# Patient Record
Sex: FEMALE | Race: BLACK OR AFRICAN AMERICAN
[De-identification: names, ages, dates, MRNs, and addresses within clinical notes are randomized per-mention and may not be internally consistent; named-entity substitution may affect disease eponyms.]

---

## 2017-05-04 ENCOUNTER — HOSPITAL ENCOUNTER (EMERGENCY)
Dept: HOSPITAL 17 - NEPK | Age: 52
Discharge: HOME | End: 2017-05-04
Payer: MEDICARE

## 2017-05-04 VITALS
HEART RATE: 84 BPM | TEMPERATURE: 97.8 F | SYSTOLIC BLOOD PRESSURE: 160 MMHG | DIASTOLIC BLOOD PRESSURE: 80 MMHG | OXYGEN SATURATION: 95 % | RESPIRATION RATE: 16 BRPM

## 2017-05-04 VITALS — HEIGHT: 65 IN | BODY MASS INDEX: 29.38 KG/M2 | WEIGHT: 176.37 LBS

## 2017-05-04 DIAGNOSIS — F17.210: ICD-10-CM

## 2017-05-04 DIAGNOSIS — I10: ICD-10-CM

## 2017-05-04 DIAGNOSIS — D64.9: ICD-10-CM

## 2017-05-04 DIAGNOSIS — Z79.01: ICD-10-CM

## 2017-05-04 DIAGNOSIS — E11.9: ICD-10-CM

## 2017-05-04 DIAGNOSIS — J01.90: Primary | ICD-10-CM

## 2017-05-04 PROCEDURE — 99283 EMERGENCY DEPT VISIT LOW MDM: CPT

## 2017-05-04 NOTE — PD
Physical Exam


Date Seen by Provider:  May 4, 2017


Time Seen by Provider:  15:35


Narrative


Pt is a 53 y/o female presenting to the ED with cc of left ear pain. This has 

been ongoing for a month. Pt c/o a frontal headache. She has not taken anything 

for the pain. Pt denies any nasal congestion, sore throat, cough. VSS. She has 

a primary she has not followed up with, she was prescribed abx recently.





Data


Data


Last Documented VS





Vital Signs








  Date Time  Temp Pulse Resp B/P Pulse Ox O2 Delivery O2 Flow Rate FiO2


 


5/4/17 15:30 97.8 84 16 160/80 95   











MDM


Supervised Visit with PANDA:  Erin Wyatt May 4, 2017 15:38

## 2017-05-04 NOTE — PD
HPI


.


left ear pain and frontal sinus pressure for 1 mt


Chief Complaint:  ENT Complaint


Time Seen by Provider:  15:40


Travel History


International Travel<30 days:  No


Contact w/Intl Traveler<30days:  No


Traveled to known affect area:  No





History of Present Illness


HPI


52-year-old female with hypertension, hyperlipidemia, diabetes and COPD here 

with complaints of left-sided ear pain and frontal sinus pressure for over one 

month.  Patient says she was seen by her primary care provider and completed 

antibiotics.  She initially got better and now her symptoms are back.  She 

decided to come to the emergency department for further evaluation.  Patient is 

complaining of a throbbing type of headache that is intermittent in her sinus 

area on the front.  She tells me that it's not really a headache but it's more 

like pressure.  She also tells me that she has some left ear pressure.  She 

denies any fever or chills.  She denies any recent illness.





PFSH


Past Medical History


Hx Anticoagulant Therapy:  Yes (ASA)


Anemia:  Yes


Arthritis:  Yes (LEFT KNEE)


Asthma:  No


Blood Disorders:  No


Anxiety:  No


Depression:  No


Heart Rhythm Problems:  No


Cancer:  No


Cardiac Catheterization:  Yes (DENIES BUT PREVIOUSLY LISTED AS YES)


Cardiovascular Problems:  Yes (HTN)


High Cholesterol:  Yes


Chemotherapy:  No


Chest Pain:  No


Congestive Heart Failure:  No


COPD:  Yes


Diabetes:  Yes


Diminished Hearing:  No


Endocrine:  Yes


GERD:  Yes


Glaucoma:  No


Gout:  Yes


Genitourinary:  No


Hepatitis:  No


Hiatal Hernia:  No


Hypertension:  Yes


Immune Disorder:  No


Implanted Vascular Access Dvce:  No


Kidney Stones:  No


Musculoskeletal:  Yes


Neurologic:  Yes (CVA)


Psychiatric:  No


Reproductive:  No


Respiratory:  Yes (COPD)


Immunizations Current:  Yes


Migraines:  No


Myocardial Infarction:  No


Pneumonia:  Yes


Radiation Therapy:  No


Renal Failure:  No


Seizures:  No


Sickle Cell Disease:  No


Sleep Apnea:  No


Thyroid Disease:  No


Ulcer:  No


Pregnant?:  Not Pregnant


Menopausal:  Yes


:  5


Para:  7


Miscarriage:  0


:  0


Tubal Ligation:  Yes





Past Surgical History


Abdominal Surgery:  No


Appendectomy:  No


Arteriovenous Shunt:  No


Cardiac Surgery:  No


 Section:  Yes (4)


Cholecystectomy:  No


Ear Surgery:  No


Endocrine Surgery:  No


Eye Surgery:  No


Genitourinary Surgery:  No


Gynecologic Surgery:  Yes (4 CSECTIONS)


Neurologic Surgery:  Yes (CVA)


Oral Surgery:  No


Pacemaker:  No


Thoracic Surgery:  No





Social History


Alcohol Use:  No


Tobacco Use:  Yes (1/2 PACK A DAY)


Substance Use:  No





Allergies-Medications


(Allergen,Severity, Reaction):  


Coded Allergies:  


     Levaquin (Verified  Allergy, Severe, ITCHING, 17)


 OCCURRED THIS FIRST DOSE; ONLY RECEIVED 1/2


Reported Meds & Prescriptions





Reported Meds & Active Scripts


Active


Flonase Nasal Spray (Fluticasone Nasal Spray) 50 Mcg/Act [place] 50 Mcg EACH NARE 

BID


Augmentin (Amoxicillin-Clavulanate) 875-125 mg Tab 875 Mg PO BID


     not for use in CrCl <30 ml/min.


Amoxicillin 500 Mg Cap 1 Tab PO TID


Deltasone (Prednisone) 20 Mg Tab 1 Tab PO DAILY 5 Days


Ventolin Hfa (Albuterol Sulfate) 18 Gm Aero 2 Puff INH Q4H PRN


     * SHAKE WELL BEFORE USE *


Klor-Con 10 (Potassium Chloride) 10 Meq Tabcr 20 Meq PO DAILY 30 Days


Nifedipine Er (Nifedipine) 60 Mg Tabcr 60 Mg PO DAILY 30 Days


Reported


Ambien 10 Mg Tab (Zolpidem Tartrate) 10 Mg Tab 10 Mg PO HS


Glipizide 5 Mg Tab 5 Mg PO BID


Famotidine 20 Mg Tab 20 Mg PO DAILY


Aspir-81 (Aspirin) 81 Mg Tab 81 Mg PO DAILY


Vitamin D (Cholecalciferol) 400 Unit Bashir 0 PO DAILY


     


     UNKNOWN DOSE


Proventil Ud 0.083% (2.5 Mg/3 Ml) (Albuterol Sulfate) 2.5 Mg/3 Ml Inha 2.5 Mg 

INH Q4 PRN


Atorvastatin 20 mg tab (Atorvastatin Calcium) 20 Mg Tab 20 Mg PO DAILY 30 Days


Metformin (Metformin HCl) 1,000 Mg Tab 1,000 Mg PO BID


Proair Hfa (Albuterol Sulfate) 8.5 Gm Aero 2 Puff INH Q4H PRN


     * SHAKE WELL BEFORE USE *


Hctz (Hydrochlorothiazide) 25 Mg Tab 25 Mg PO DAILY








Review of Systems


General / Constitutional:  No: Fever


Eyes:  No: Visual changes


HENT:  Positive: Headaches, Earache, Other (facial pressure)


Cardiovascular:  No: Chest Pain or Discomfort


Respiratory:  No: Shortness of Breath


Gastrointestinal:  No: Abdominal Pain


Genitourinary:  No: Dysuria


Musculoskeletal:  No: Pain


Skin:  No Rash


Neurologic:  No: Weakness


Psychiatric:  No: Depression


Endocrine:  No: Polydipsia


Hematologic/Lymphatic:  No: Easy Bruising





Physical Exam


Narrative


GENERAL: AAO x 3, no acute distress, Well-nourished, well-developed patient.


SKIN: Warm and dry. No visible rashes or bruising. 


HEAD: Normocephalic and atraumatic.


EYES: No scleral icterus. No injection or drainage. EOM intact, PERRLA, 


ENT: No nasal drainage noted. Mucous membranes pink. Airway patent. B/L ear 

canal with mild-moderate cerumen, TM otherwise normal, + post nasal drip, + 

frontal sinus tenderness 


NECK: Supple, trachea midline. No JVD.  No lymphadenopathy


CARDIOVASCULAR: Regular rate and rhythm without murmurs, gallops, or rubs. 


RESPIRATORY: Breath sounds equal bilaterally. No accessory muscle use. No 

rhonchi or rales. 


GASTROINTESTINAL: Visual inspection is normal


EXTREMITIES: No cyanosis or edema. 


BACK: Nontender without obvious deformity. No CVA tenderness.


PSYCH: AAO x 3, normal affect.





Data


Data


Last Documented VS





Vital Signs








  Date Time  Temp Pulse Resp B/P Pulse Ox O2 Delivery O2 Flow Rate FiO2


 


17 15:30 97.8 84 16 160/80 95   











MDM


Medical Decision Making


Medical Screen Exam Complete:  Yes


Emergency Medical Condition:  Yes


Medical Record Reviewed:  Yes


Differential Diagnosis


Sinusitis, otitis media, otitis externa cerumen impaction, less likely pneumonia


Narrative Course


52-year-old female with hypertension, hyperlipidemia, diabetes and COPD here 

with complaints of left-sided ear pain and frontal sinus pressure for over one 

month.  Patient says she was seen by her primary care provider and completed 

antibiotics.  She initially got better and now her symptoms are back.  She 

decided to come to the emergency department for further evaluation.  Patient is 

complaining of a throbbing type of headache that is intermittent in her sinus 

area on the front.  She tells me that it's not really a headache but it's more 

like pressure.  She also tells me that she has some left ear pressure.  She 

denies any fever or chills.  She denies any recent illness.





Patient seen and examined.  She appears to have an acute sinusitis.  She also 

has mild to moderate buildup of cerumen bilaterally in her ears.


I recommend that she try some antibiotics to help with her sinus infection.


I've also explained to her to try Debrox over-the-counter eardrops to help 

reduce her cerumen buildup.


She should try to follow-up with her primary care provider.





Patient verbalized understanding of instructions, questions were answered, and 

thanked me for their care. I advised them if their condition worsens, please 

return to the nearest emergency room for further care.





Diagnosis





 Primary Impression:  


 Acute sinusitis


 Qualified Code:  J01.10 - Acute non-recurrent frontal sinusitis


 Additional Impression:  


 Cerumen debris on tympanic membrane of both ears


Patient Instructions:  General Instructions





***Additional Instructions:


Please return to emergency department if your symptoms return or worsen. 


Follow up with your primary care provider. 


Take medications as prescribed.





Try over-the-counter Debrox to help reduce your ear wax buildup.


***Med/Other Pt SpecificInfo:  Prescription(s) given


Scripts


Fluticasone Nasal Spray (Flonase Nasal Spray)50 Mcg/Act Spray50 Mcg EACH NARE 

BID  #1 BOTTLE  Ref 0


   Prov:Tami Goyal MD         17 


Amoxicillin-Clavulanate (Augmentin)875-125 mg Brq093 Mg PO BID  #20 TAB


   not for use in CrCl <30 ml/min.


   Prov:Tami Goyal MD         17


Disposition:  01 DISCHARGE HOME


Condition:  Stable








Gifty Rivas May 4, 2017 15:40

## 2017-07-15 ENCOUNTER — HOSPITAL ENCOUNTER (EMERGENCY)
Dept: HOSPITAL 17 - NEPD | Age: 52
Discharge: HOME | End: 2017-07-15
Payer: MEDICARE

## 2017-07-15 VITALS
RESPIRATION RATE: 24 BRPM | SYSTOLIC BLOOD PRESSURE: 175 MMHG | DIASTOLIC BLOOD PRESSURE: 90 MMHG | TEMPERATURE: 98.7 F | HEART RATE: 98 BPM | OXYGEN SATURATION: 96 %

## 2017-07-15 VITALS — HEIGHT: 65 IN | WEIGHT: 174.17 LBS | BODY MASS INDEX: 29.02 KG/M2

## 2017-07-15 DIAGNOSIS — M54.89: Primary | ICD-10-CM

## 2017-07-15 DIAGNOSIS — E11.9: ICD-10-CM

## 2017-07-15 DIAGNOSIS — Z86.69: ICD-10-CM

## 2017-07-15 DIAGNOSIS — Z87.39: ICD-10-CM

## 2017-07-15 DIAGNOSIS — Z87.09: ICD-10-CM

## 2017-07-15 DIAGNOSIS — Z86.2: ICD-10-CM

## 2017-07-15 DIAGNOSIS — Z87.19: ICD-10-CM

## 2017-07-15 DIAGNOSIS — F17.200: ICD-10-CM

## 2017-07-15 DIAGNOSIS — Z79.84: ICD-10-CM

## 2017-07-15 DIAGNOSIS — I10: ICD-10-CM

## 2017-07-15 DIAGNOSIS — Z86.79: ICD-10-CM

## 2017-07-15 PROCEDURE — 99284 EMERGENCY DEPT VISIT MOD MDM: CPT

## 2017-07-15 PROCEDURE — 96372 THER/PROPH/DIAG INJ SC/IM: CPT

## 2017-07-15 NOTE — PD
HPI


Chief Complaint:  Back/ Neck Pain or Injury


Time Seen by Provider:  10:49


Travel History


International Travel<30 days:  No


Contact w/Intl Traveler<30days:  No


Traveled to known affect area:  No





History of Present Illness


HPI


Patient is a 52-year-old female comes in complaining of upper back pain.  She 

says about 2 days ago she was carrying a heavy box and a watermelon, the next 

morning she woke up with the severe pain.  She says pain is worse with 

movement.  She localizes the pain to Her left scapula.  She denies any direct 

injury.  She denies any falls.  She denies any numbness or tingling of her 

extremities.  She denies any incontinence.  She denies chest pain or shortness 

of breath.  She has not taken anything at home for pain.





PFSH


Past Medical History


Hx Anticoagulant Therapy:  No


Anemia:  Yes


Arthritis:  Yes (LEFT KNEE)


Asthma:  No


Blood Disorders:  No


Anxiety:  No


Depression:  No


Heart Rhythm Problems:  No


Cancer:  No


Cardiac Catheterization:  Yes (DENIES BUT PREVIOUSLY LISTED AS YES)


Cardiovascular Problems:  Yes (HTN)


High Cholesterol:  Yes


Chemotherapy:  No


Chest Pain:  No


Congestive Heart Failure:  No


COPD:  Yes


Cerebrovascular Accident:  Yes


Diabetes:  Yes


Patient Takes Glucophage:  Yes


Diminished Hearing:  No


Endocrine:  Yes


GERD:  Yes


Glaucoma:  No


Gout:  Yes


Genitourinary:  No


Hepatitis:  No


Hiatal Hernia:  No


Hypertension:  Yes


Immune Disorder:  No


Implanted Vascular Access Dvce:  No


Kidney Stones:  No


Musculoskeletal:  Yes


Neurologic:  Yes (CVA)


Psychiatric:  No


Reproductive:  No


Respiratory:  Yes (COPD)


Immunizations Current:  Yes


Migraines:  No


Myocardial Infarction:  No


Pneumonia:  Yes


Radiation Therapy:  No


Renal Failure:  No


Seizures:  No


Sickle Cell Disease:  No


Sleep Apnea:  No


Thyroid Disease:  No


Ulcer:  No


Tetanus Vaccination:  Unknown


Pregnant?:  Unknown


Menopausal:  Yes


:  5


Para:  7


Miscarriage:  0


:  0


Tubal Ligation:  Yes





Past Surgical History


Abdominal Surgery:  No


Appendectomy:  No


Arteriovenous Shunt:  No


 Section:  Yes (4)


Gynecologic Surgery:  Yes (4 CSECTIONS)


Hysterectomy:  No


Neurologic Surgery:  Yes (CVA)


Pacemaker:  No


Other Surgery:  Yes (BREAST SURGERY LEFT BREAST ABCESS 2X )





Social History


Alcohol Use:  No


Tobacco Use:  Yes (1/2 PACK A DAY)


Substance Use:  No





Allergies-Medications


(Allergen,Severity, Reaction):  


Coded Allergies:  


     Levaquin (Verified  Allergy, Severe, ITCHING, 7/15/17)


 OCCURRED THIS FIRST DOSE; ONLY RECEIVED 1/2


Reported Meds & Prescriptions





Reported Meds & Active Scripts


Active


Flonase Nasal Spray (Fluticasone Nasal Spray) 50 Mcg/Act [place] 50 Mcg EACH NARE 

BID


Augmentin (Amoxicillin-Clavulanate) 875-125 mg Tab 875 Mg PO BID


     not for use in CrCl <30 ml/min.


Amoxicillin 500 Mg Cap 1 Tab PO TID


Deltasone (Prednisone) 20 Mg Tab 1 Tab PO DAILY 5 Days


Ventolin Hfa (Albuterol Sulfate) 18 Gm Aero 2 Puff INH Q4H PRN


     * SHAKE WELL BEFORE USE *


Klor-Con 10 (Potassium Chloride) 10 Meq Tabcr 20 Meq PO DAILY 30 Days


Nifedipine Er (Nifedipine) 60 Mg Tabcr 60 Mg PO DAILY 30 Days


Reported


Ambien 10 Mg Tab (Zolpidem Tartrate) 10 Mg Tab 10 Mg PO HS


Glipizide 5 Mg Tab 5 Mg PO BID


Famotidine 20 Mg Tab 20 Mg PO DAILY


Aspir-81 (Aspirin) 81 Mg Tab 81 Mg PO DAILY


Vitamin D (Cholecalciferol) 400 Unit Bashir 0 PO DAILY


     


     UNKNOWN DOSE


Proventil Ud 0.083% (2.5 Mg/3 Ml) (Albuterol Sulfate) 2.5 Mg/3 Ml Inha 2.5 Mg 

INH Q4 PRN


Atorvastatin 20 mg tab (Atorvastatin Calcium) 20 Mg Tab 20 Mg PO DAILY 30 Days


Metformin (Metformin HCl) 1,000 Mg Tab 1,000 Mg PO BID


Proair Hfa (Albuterol Sulfate) 8.5 Gm Aero 2 Puff INH Q4H PRN


     * SHAKE WELL BEFORE USE *


Hctz (Hydrochlorothiazide) 25 Mg Tab 25 Mg PO DAILY








Review of Systems


Except as stated in HPI:  all other systems reviewed are Neg


General / Constitutional:  No: Fever, Chills


HENT:  No: Headaches, Lightheadedness


Cardiovascular:  No: Chest Pain or Discomfort


Respiratory:  No: Shortness of Breath


Gastrointestinal:  No: Nausea, Vomiting


Musculoskeletal:  Positive: Pain


Skin:  No Rash, No Change in Pigmentation


Neurologic:  No: Paresthesia, Sensory Disturbance





Physical Exam


Narrative


GENERAL: Awake and alert, in no acute distress.


SKIN: Focused skin assessment warm/dry.


HEAD: Atraumatic. Normocephalic. 


EYES: Pupils equal and round. No scleral icterus. No injection or drainage. 


ENT: No nasal bleeding or discharge.  Mucous membranes pink and moist.


NECK: Trachea midline. No JVD. 


CARDIOVASCULAR: Regular rate and rhythm.  No murmur appreciated.


RESPIRATORY: No accessory muscle use. Clear to auscultation. Breath sounds 

equal bilaterally. 


MUSCULOSKELETAL: No obvious deformities. No clubbing.  No cyanosis.  No edema. 

Tender to palpation adjacent to the left scapula.  No spinal tenderness.  


NEUROLOGICAL: Awake and alert. No obvious cranial nerve deficits.  Motor 

grossly within normal limits. Normal speech.


PSYCHIATRIC: Appropriate mood and affect; insight and judgment normal.





Data


Data


Last Documented VS





Vital Signs








  Date Time  Temp Pulse Resp B/P Pulse Ox O2 Delivery O2 Flow Rate FiO2


 


7/15/17 09:50 98.7 98 24 175/90 96 Room Air  








Orders





 Ketorolac Inj (Toradol Inj) (7/15/17 11:00)


Cyclobenzaprine (Flexeril) (7/15/17 11:00)








[facility]


Medical Decision Making


Medical Screen Exam Complete:  Yes


Emergency Medical Condition:  Yes


Medical Record Reviewed:  Yes


Differential Diagnosis


Musculoskeletal pain vs fracture vs spasm


Narrative Course


Patient is a 52-year-old female comes in complaining of back pain.  Exam shows 

tenderness to the paraspinal muscles.  Patient given Toradol and Flexeril.  She 

reports feeling much better.  Discharge with prescription for Flexeril.  She is 

advised to stretch and take Flexeril and Tylenol as needed.  Advised to follow-

up with her doctor.  Advised to return to the emergency department as needed 

for any worsening symptoms.





Diagnosis





 Primary Impression:  


 Musculoskeletal back pain


Patient Instructions:  General Instructions, Musculoskeletal Pain (ED)





***Additional Instructions:


Take Tylenol and Flexeril as needed for pain.  Follow up with your doctor.  

Return to the ED as needed for any worsening symptoms.


Scripts


Cyclobenzaprine (Flexeril)10 Mg Tab10 Mg PO TID  #15 TAB  Ref 0


   Prov:Tami Goyal MD         7/15/17


Disposition:  01 DISCHARGE HOME


Condition:  Stable








Tami Goyal MD Jul 15, 2017 11:43

## 2018-01-30 ENCOUNTER — HOSPITAL ENCOUNTER (INPATIENT)
Dept: HOSPITAL 17 - NEPE | Age: 53
LOS: 3 days | Discharge: HOME | DRG: 194 | End: 2018-02-02
Attending: HOSPITALIST | Admitting: HOSPITALIST
Payer: MEDICARE

## 2018-01-30 VITALS
DIASTOLIC BLOOD PRESSURE: 88 MMHG | SYSTOLIC BLOOD PRESSURE: 179 MMHG | RESPIRATION RATE: 18 BRPM | HEART RATE: 77 BPM | OXYGEN SATURATION: 97 %

## 2018-01-30 VITALS
SYSTOLIC BLOOD PRESSURE: 185 MMHG | OXYGEN SATURATION: 94 % | RESPIRATION RATE: 18 BRPM | DIASTOLIC BLOOD PRESSURE: 100 MMHG | TEMPERATURE: 97.5 F | HEART RATE: 88 BPM

## 2018-01-30 VITALS
SYSTOLIC BLOOD PRESSURE: 171 MMHG | TEMPERATURE: 98.5 F | DIASTOLIC BLOOD PRESSURE: 99 MMHG | OXYGEN SATURATION: 98 % | HEART RATE: 84 BPM | RESPIRATION RATE: 18 BRPM

## 2018-01-30 VITALS — OXYGEN SATURATION: 95 %

## 2018-01-30 VITALS
OXYGEN SATURATION: 92 % | RESPIRATION RATE: 18 BRPM | TEMPERATURE: 98.6 F | HEART RATE: 80 BPM | DIASTOLIC BLOOD PRESSURE: 94 MMHG | SYSTOLIC BLOOD PRESSURE: 169 MMHG

## 2018-01-30 VITALS — HEIGHT: 65 IN | BODY MASS INDEX: 32.03 KG/M2 | WEIGHT: 192.24 LBS

## 2018-01-30 VITALS — HEART RATE: 86 BPM

## 2018-01-30 DIAGNOSIS — N18.3: ICD-10-CM

## 2018-01-30 DIAGNOSIS — Z79.84: ICD-10-CM

## 2018-01-30 DIAGNOSIS — R74.8: ICD-10-CM

## 2018-01-30 DIAGNOSIS — N17.9: ICD-10-CM

## 2018-01-30 DIAGNOSIS — J44.0: ICD-10-CM

## 2018-01-30 DIAGNOSIS — J44.1: ICD-10-CM

## 2018-01-30 DIAGNOSIS — J10.00: Primary | ICD-10-CM

## 2018-01-30 DIAGNOSIS — E87.6: ICD-10-CM

## 2018-01-30 DIAGNOSIS — Z83.3: ICD-10-CM

## 2018-01-30 DIAGNOSIS — Z87.442: ICD-10-CM

## 2018-01-30 DIAGNOSIS — I12.9: ICD-10-CM

## 2018-01-30 DIAGNOSIS — D50.9: ICD-10-CM

## 2018-01-30 DIAGNOSIS — Z86.73: ICD-10-CM

## 2018-01-30 DIAGNOSIS — K21.9: ICD-10-CM

## 2018-01-30 DIAGNOSIS — E78.5: ICD-10-CM

## 2018-01-30 DIAGNOSIS — E11.21: ICD-10-CM

## 2018-01-30 DIAGNOSIS — Z87.891: ICD-10-CM

## 2018-01-30 DIAGNOSIS — Z82.49: ICD-10-CM

## 2018-01-30 DIAGNOSIS — E11.22: ICD-10-CM

## 2018-01-30 LAB
BASOPHILS # BLD AUTO: 0.1 TH/MM3 (ref 0–0.2)
BASOPHILS NFR BLD: 0.9 % (ref 0–2)
BUN SERPL-MCNC: 51 MG/DL (ref 7–18)
CALCIUM SERPL-MCNC: 8.3 MG/DL (ref 8.5–10.1)
CHLORIDE SERPL-SCNC: 112 MEQ/L (ref 98–107)
CREAT SERPL-MCNC: 2.25 MG/DL (ref 0.5–1)
EOSINOPHIL # BLD: 0.4 TH/MM3 (ref 0–0.4)
EOSINOPHIL NFR BLD: 6.3 % (ref 0–4)
ERYTHROCYTE [DISTWIDTH] IN BLOOD BY AUTOMATED COUNT: 15.9 % (ref 11.6–17.2)
GFR SERPLBLD BASED ON 1.73 SQ M-ARVRAT: 28 ML/MIN (ref 89–?)
GLUCOSE SERPL-MCNC: 105 MG/DL (ref 74–106)
HCO3 BLD-SCNC: 29.7 MEQ/L (ref 21–32)
HCT VFR BLD CALC: 32.2 % (ref 35–46)
HGB BLD-MCNC: 10.8 GM/DL (ref 11.6–15.3)
LYMPHOCYTES # BLD AUTO: 1 TH/MM3 (ref 1–4.8)
LYMPHOCYTES NFR BLD AUTO: 15.3 % (ref 9–44)
MAGNESIUM SERPL-MCNC: 2.5 MG/DL (ref 1.5–2.5)
MCH RBC QN AUTO: 27.9 PG (ref 27–34)
MCHC RBC AUTO-ENTMCNC: 33.5 % (ref 32–36)
MCV RBC AUTO: 83.2 FL (ref 80–100)
MONOCYTE #: 1.2 TH/MM3 (ref 0–0.9)
MONOCYTES NFR BLD: 18.1 % (ref 0–8)
NEUTROPHILS # BLD AUTO: 3.9 TH/MM3 (ref 1.8–7.7)
NEUTROPHILS NFR BLD AUTO: 59.4 % (ref 16–70)
PLATELET # BLD: 209 TH/MM3 (ref 150–450)
PMV BLD AUTO: 8.4 FL (ref 7–11)
RBC # BLD AUTO: 3.87 MIL/MM3 (ref 4–5.3)
SODIUM SERPL-SCNC: 148 MEQ/L (ref 136–145)
TROPONIN I SERPL-MCNC: 0.1 NG/ML (ref 0.02–0.05)
TROPONIN I SERPL-MCNC: 0.11 NG/ML (ref 0.02–0.05)
WBC # BLD AUTO: 6.6 TH/MM3 (ref 4–11)

## 2018-01-30 PROCEDURE — 83735 ASSAY OF MAGNESIUM: CPT

## 2018-01-30 PROCEDURE — 82948 REAGENT STRIP/BLOOD GLUCOSE: CPT

## 2018-01-30 PROCEDURE — 94640 AIRWAY INHALATION TREATMENT: CPT

## 2018-01-30 PROCEDURE — 82570 ASSAY OF URINE CREATININE: CPT

## 2018-01-30 PROCEDURE — 85025 COMPLETE CBC W/AUTO DIFF WBC: CPT

## 2018-01-30 PROCEDURE — 83550 IRON BINDING TEST: CPT

## 2018-01-30 PROCEDURE — 94664 DEMO&/EVAL PT USE INHALER: CPT

## 2018-01-30 PROCEDURE — 76775 US EXAM ABDO BACK WALL LIM: CPT

## 2018-01-30 PROCEDURE — 71046 X-RAY EXAM CHEST 2 VIEWS: CPT

## 2018-01-30 PROCEDURE — 84484 ASSAY OF TROPONIN QUANT: CPT

## 2018-01-30 PROCEDURE — 93005 ELECTROCARDIOGRAM TRACING: CPT

## 2018-01-30 PROCEDURE — 87804 INFLUENZA ASSAY W/OPTIC: CPT

## 2018-01-30 PROCEDURE — 80048 BASIC METABOLIC PNL TOTAL CA: CPT

## 2018-01-30 PROCEDURE — 85027 COMPLETE CBC AUTOMATED: CPT

## 2018-01-30 PROCEDURE — 84100 ASSAY OF PHOSPHORUS: CPT

## 2018-01-30 PROCEDURE — 82552 ASSAY OF CPK IN BLOOD: CPT

## 2018-01-30 PROCEDURE — 83540 ASSAY OF IRON: CPT

## 2018-01-30 PROCEDURE — 84300 ASSAY OF URINE SODIUM: CPT

## 2018-01-30 PROCEDURE — 82550 ASSAY OF CK (CPK): CPT

## 2018-01-30 PROCEDURE — 81001 URINALYSIS AUTO W/SCOPE: CPT

## 2018-01-30 PROCEDURE — 82043 UR ALBUMIN QUANTITATIVE: CPT

## 2018-01-30 RX ADMIN — INSULIN ASPART SCH: 100 INJECTION, SOLUTION INTRAVENOUS; SUBCUTANEOUS at 22:37

## 2018-01-30 RX ADMIN — OSELTAMIVIR PHOSPHATE SCH MG: 6 POWDER, FOR SUSPENSION ORAL at 23:05

## 2018-01-30 RX ADMIN — HEPARIN SODIUM SCH UNITS: 10000 INJECTION, SOLUTION INTRAVENOUS; SUBCUTANEOUS at 22:37

## 2018-01-30 RX ADMIN — Medication SCH ML: at 20:19

## 2018-01-30 RX ADMIN — ZOLPIDEM TARTRATE PRN MG: 10 TABLET, FILM COATED ORAL at 23:05

## 2018-01-30 RX ADMIN — PHENYTOIN SODIUM SCH MLS/HR: 50 INJECTION INTRAMUSCULAR; INTRAVENOUS at 22:39

## 2018-01-30 NOTE — PD
HPI


Chief Complaint:  Respiratory Symptoms


Time Seen by Provider:  17:49


Travel History


International Travel<30 days:  No


Contact w/Intl Traveler<30days:  No


Traveled to known affect area:  No





History of Present Illness


HPI


Patient is a 53-year-old female who comes in complaining of cough and shortness 

of breath.  She says this is been going on for 3 days.  She says she has pain 

to her upper abdomen/lower chest, but only when she coughs.  She says she is 

not sure if she has had fever or chills.  She says she just does not feel well.

  She has been using albuterol at home without much relief of her symptoms.  

She denies any sick contacts.  She denies nausea or vomiting.  She denies any 

leg swelling.  She says she did get a flu shot this year.





PFSH


Past Medical History


Hx Anticoagulant Therapy:  No


Anemia:  Yes


Arthritis:  Yes (LEFT KNEE)


Asthma:  No


Blood Disorders:  No


Anxiety:  No


Depression:  No


Heart Rhythm Problems:  No


Cancer:  No


Cardiac Catheterization:  Yes (DENIES BUT PREVIOUSLY LISTED AS YES)


Cardiovascular Problems:  Yes (HTN)


High Cholesterol:  Yes


Chemotherapy:  No


Chest Pain:  No


Congestive Heart Failure:  No


COPD:  Yes


Cerebrovascular Accident:  Yes


Diabetes:  Yes


Patient Takes Glucophage:  No


Diminished Hearing:  No


Endocrine:  Yes


GERD:  Yes


Glaucoma:  No


Gout:  Yes


Genitourinary:  No


Hepatitis:  No


Hiatal Hernia:  No


Hypertension:  Yes


Immune Disorder:  No


Implanted Vascular Access Dvce:  No


Kidney Stones:  No


Musculoskeletal:  Yes


Neurologic:  Yes (CVA)


Psychiatric:  No


Reproductive:  No


Respiratory:  Yes (COPD)


Immunizations Current:  Yes


Migraines:  No


Myocardial Infarction:  No


Pneumonia:  Yes


Radiation Therapy:  No


Renal Failure:  No


Seizures:  No


Sickle Cell Disease:  No


Sleep Apnea:  No


Thyroid Disease:  No


Ulcer:  No


Influenza Vaccination:  Yes


Pregnant?:  Not Pregnant


Menopausal:  Yes


:  5


Para:  7


Miscarriage:  0


:  0


Tubal Ligation:  Yes





Past Surgical History


Abdominal Surgery:  No


Appendectomy:  No


Arteriovenous Shunt:  No


 Section:  Yes (4)


Gynecologic Surgery:  Yes (4 CSECTIONS)


Hysterectomy:  No


Neurologic Surgery:  Yes (CVA)


Pacemaker:  No


Other Surgery:  Yes (BREAST SURGERY LEFT BREAST ABCESS 2X )





Social History


Alcohol Use:  No


Tobacco Use:  Yes


Substance Use:  No





Allergies-Medications


(Allergen,Severity, Reaction):  


Coded Allergies:  


     levofloxacin (Unverified  Allergy, Severe, ITCHING, 18)


 OCCURRED THIS FIRST DOSE; ONLY RECEIVED /2


Reported Meds & Prescriptions





Reported Meds & Active Scripts


Active


Reported


Glipizide 10 Mg Tab 10 Mg PO BIDAC


     Take 30 minutes before a meal


Zolpidem (Zolpidem Tartrate) 10 Mg Tab 10 Mg PO HS PRN


Vitamin C (Ascorbic Acid) 250 Mg Tab 500 Mg PO DAILY


Aspirin 81 Mg Chew 81 Mg CHEW DAILY


Nifedipine ER 24 HR (Nifedipine) 60 Mg Tab 60 Mg PO DAILY


Losartan (Losartan Potassium) 100 Mg Tab 100 Mg PO DAILY


Metformin (Metformin HCl) 1,000 Mg Tab 1,000 Mg PO DAILY


     With a meal


D3 Super Strength (Cholecalciferol) 2,000 Unit Cap 1,000 Units PO DAILY


Omega-3 Fish Oil/Vitamin (Fish Oil-Cholecalciferol) 1,000-1,000 Mg Cap 1 Cap PO 

DAILY








Review of Systems


Except as stated in HPI:  all other systems reviewed are Neg


General / Constitutional:  Positive: Fever, Chills


HENT:  No: Headaches, Lightheadedness


Respiratory:  Positive: Cough, Shortness of Breath


Gastrointestinal:  No: Nausea, Vomiting


Genitourinary:  No: Dysuria


Musculoskeletal:  Positive: Myalgias, No: Edema


Skin:  No Rash, No Change in Pigmentation


Neurologic:  No: Weakness, Dizziness





Physical Exam


Narrative


GENERAL: Awake and alert, in no acute distress.


SKIN: Focused skin assessment warm/dry.  No wounds or signs of infection.


HEAD: Atraumatic. Normocephalic. 


EYES: Pupils equal and round. No scleral icterus. 


ENT: Mucous membranes pink and moist.


NECK: Trachea midline. No JVD. 


CARDIOVASCULAR: Regular rate and rhythm.  No murmur appreciated.


RESPIRATORY: No accessory muscle use. Clear to auscultation. Breath sounds 

equal bilaterally. 


GASTROINTESTINAL: Abdomen soft, non-tender, nondistended. Hepatic and splenic 

margins not palpable. 


MUSCULOSKELETAL: No obvious deformities. No clubbing.  No cyanosis.  No edema. 


NEUROLOGICAL: Awake and alert. No obvious cranial nerve deficits.  Motor 

grossly within normal limits. Normal speech.


PSYCHIATRIC: Appropriate mood and affect; insight and judgment normal.





Data


Data


Last Documented VS





Vital Signs








  Date Time  Temp Pulse Resp B/P (MAP) Pulse Ox O2 Delivery O2 Flow Rate FiO2


 


18 15:14 98.5 84 18 171/99 (123) 98   








Orders





 Orders


Complete Blood Count With Diff (18 15:36)


Basic Metabolic Panel (Bmp) (18 15:36)


Magnesium (Mg) (18 15:36)


Ckmb (Isoenzyme) Profile (18 15:36)


Troponin I (18 15:36)


Influenzae A/B Antigen (18 15:36)


Electrocardiogram (18 15:36)


Chest, Pa & Lat (18 15:36)


CKMB (18 16:18)


CKMB% (18 16:18)


Ceftriaxone Inj (Rocephin Inj) (18 18:00)


Azithromycin Inj (Zithromax Inj) (18 18:00)


Aspirin Chew (Aspirin Chew) (18 18:00)


Sodium Chlor 0.9% 1000 Ml Inj (Ns 1000 M (18 18:00)


Acetaminophen (Tylenol) (18 18:00)


Methylprednisolone So Succ Inj (Solumedr (18 18:00)


Albuterol-Ipratropium Neb (Duoneb Neb) (18 18:00)


Admit Order (Ed Use Only) (18 )





Labs





Laboratory Tests








Test


  18


16:18


 


White Blood Count 6.6 TH/MM3 


 


Red Blood Count 3.87 MIL/MM3 


 


Hemoglobin 10.8 GM/DL 


 


Hematocrit 32.2 % 


 


Mean Corpuscular Volume 83.2 FL 


 


Mean Corpuscular Hemoglobin 27.9 PG 


 


Mean Corpuscular Hemoglobin


Concent 33.5 % 


 


 


Red Cell Distribution Width 15.9 % 


 


Platelet Count 209 TH/MM3 


 


Mean Platelet Volume 8.4 FL 


 


Neutrophils (%) (Auto) 59.4 % 


 


Lymphocytes (%) (Auto) 15.3 % 


 


Monocytes (%) (Auto) 18.1 % 


 


Eosinophils (%) (Auto) 6.3 % 


 


Basophils (%) (Auto) 0.9 % 


 


Neutrophils # (Auto) 3.9 TH/MM3 


 


Lymphocytes # (Auto) 1.0 TH/MM3 


 


Monocytes # (Auto) 1.2 TH/MM3 


 


Eosinophils # (Auto) 0.4 TH/MM3 


 


Basophils # (Auto) 0.1 TH/MM3 


 


CBC Comment DIFF FINAL 


 


Differential Comment  


 


Blood Urea Nitrogen 51 MG/DL 


 


Creatinine 2.25 MG/DL 


 


Random Glucose 105 MG/DL 


 


Calcium Level 8.3 MG/DL 


 


Magnesium Level 2.5 MG/DL 


 


Sodium Level 148 MEQ/L 


 


Potassium Level 4.0 MEQ/L 


 


Chloride Level 112 MEQ/L 


 


Carbon Dioxide Level 29.7 MEQ/L 


 


Anion Gap 6 MEQ/L 


 


Estimat Glomerular Filtration


Rate 28 ML/MIN 


 


 


Total Creatine Kinase 202 U/L 


 


Creatine Kinase MB 1.2 NG/ML 


 


Creatine Kinase MB % 0.6 % 


 


Troponin I 0.11 NG/ML 











MDM


Medical Decision Making


Medical Screen Exam Complete:  Yes


Emergency Medical Condition:  Yes


Medical Record Reviewed:  Yes


Interpretation(s)


ECG shows sinus rhythm, no ST elevation.


Differential Diagnosis


Influenza versus COPD exacerbation versus pneumonia


Narrative Course


Patient is a 53-year-old female comes in complaining of increasing shortness of 

breath.  IV established, labs sent.  Labs show a troponin of 0.11.  Flu swab is 

positive.  X-ray concerning for pneumonia.  Given antibiotics.  Given duo nebs.

  She will be admitted for further management.  Given aspirin.





Diagnosis





 Primary Impression:  


 COPD exacerbation


 Additional Impressions:  


 Elevated troponin


 Influenza


 Pneumonia


 Qualified Codes:  J18.9 - Pneumonia, unspecified organism





Admitting Information


Admitting Physician Requests:  Admit











Tami Goyal MD 2018 18:02

## 2018-01-30 NOTE — RADRPT
EXAM DATE/TIME:  01/30/2018 15:46 

 

[facility] COMPARISON:     

CHEST SINGLE AP, August 15, 2016, 7:33.

 

                     

INDICATIONS :     

Short of breath. Cough. 

                     

 

MEDICAL HISTORY :     

Chronic obstructive pulmonary disease.  Hypertension  Diabetes mellitus type II.   Stroke.    

 

SURGICAL HISTORY :     

None.   

 

ENCOUNTER:     

Initial                                        

 

ACUITY:     

3 days      

 

PAIN SCORE:     

0/10

 

LOCATION:     

Bilateral chest 

 

FINDINGS:     

PA and lateral views of the chest demonstrate minimal density right lower lobe. Left lung clear. Hear
t normal in size The cardiomediastinal contours are unremarkable.  Osseous structures are intact.

 

CONCLUSION:     

Minimal patchy densities right lower lobe could be infiltrate..

 

 

 

 Sarwat Villanueva MD on January 30, 2018 at 16:15           

Board Certified Radiologist.

 This report was verified electronically.

## 2018-01-30 NOTE — HHI.HP
__________________________________________________





HPI


Service


[facility]


Primary Care Physician


Barry James M.D.


Admission Diagnosis





Cough, Pneumonia, elevated troponin, influenza


Diagnoses:  


Travel History


International Travel<30 Days:  No


Contact w/Intl Traveler <30 Da:  No


Traveled to Known Affected Are:  No


History of Present Illness


53-year-old female with past medical history significant for COPD, diabetes 

mellitus, hypertension, hyperlipidemia and CK D presents to the emergency 

department with 3 days of progressively worsening shortness of breath.  Patient 

reports she had URI symptoms that began 3 days ago including congestion and a 

cough productive of yellow sputum.  She states she has been having a difficult 

time catching her breath, worse with activity.  She endorses fever/chills of 

one day duration.  Denies chest pain.  Patient is positive for flu and has a 

chest x-ray concerning for pneumonia.  Vital signs: Temperature 98.5, pulse 84, 

respirations 18, /99, pulse ox 98% on room air.





Review of Systems


Except as stated in HPI:  all other systems reviewed are Neg


Shortness of breath, productive cough, fever/chills





Past Family Social History


Past Medical History


COPD


Diabetes mellitus


Hypertension


Hyperlipidemia


CKD


Past Surgical History





Reported Medications





Reported Meds & Active Scripts


Active


Reported


Glipizide 10 Mg Tab 10 Mg PO BIDAC


     Take 30 minutes before a meal


Zolpidem (Zolpidem Tartrate) 10 Mg Tab 10 Mg PO HS PRN


Vitamin C (Ascorbic Acid) 250 Mg Tab 500 Mg PO DAILY


Aspirin 81 Mg Chew 81 Mg CHEW DAILY


Nifedipine ER 24 HR (Nifedipine) 60 Mg Tab 60 Mg PO DAILY


Losartan (Losartan Potassium) 100 Mg Tab 100 Mg PO DAILY


Metformin (Metformin HCl) 1,000 Mg Tab 1,000 Mg PO DAILY


     With a meal


D3 Super Strength (Cholecalciferol) 2,000 Unit Cap 1,000 Units PO DAILY


Omega-3 Fish Oil/Vitamin (Fish Oil-Cholecalciferol) 1,000-1,000 Mg Cap 1 Cap PO 

DAILY


Allergies:  


Coded Allergies:  


     levofloxacin (Unverified  Allergy, Severe, ITCHING, 18)


 OCCURRED THIS FIRST DOSE; ONLY RECEIVED 1/2


Family History


Mother with CAD/DM


Social History


Quit smoking on 17.  Denies alcohol, illicit drugs.





Physical Exam


Vital Signs





Vital Signs








  Date Time  Temp Pulse Resp B/P (MAP) Pulse Ox O2 Delivery O2 Flow Rate FiO2


 


18 19:55  77 18 179/88 (118) 97 Room Air  


 


18 15:14 98.5 84 18 171/99 (123) 98   








Physical Exam


GENERAL:  female sitting up in bed


SKIN: No rashes, ecchymoses or lesions. Cool and dry.


HEAD: Atraumatic. Normocephalic. No temporal or scalp tenderness.


EYES: Pupils equal round and reactive. Extraocular motions intact. No scleral 

icterus. No injection or drainage. 


ENT: Nose without bleeding, purulent drainage or septal hematoma. Throat 

without erythema, tonsillar hypertrophy or exudate. Uvula midline. Airway 

patent.


NECK: Trachea midline. No JVD or lymphadenopathy. Supple, nontender, no 

meningeal signs.


CARDIOVASCULAR: Regular rate and rhythm without murmurs, gallops, or rubs. 


RESPIRATORY: Clear to auscultation. Breath sounds equal bilaterally. No wheezes

, rales, or rhonchi.  Poor air movement.


GASTROINTESTINAL: Abdomen soft, non-tender, nondistended. No hepato-splenomegaly

, or palpable masses. No guarding.


MUSCULOSKELETAL: Extremities without clubbing, cyanosis, or edema. No joint 

tenderness, effusion, or edema noted. No calf tenderness. 


NEUROLOGICAL: Awake and alert. Cranial nerves II through XII intact.  Motor and 

sensory grossly within normal limits. Normal speech.


Laboratory





Laboratory Tests








Test


  18


16:18


 


White Blood Count 6.6 


 


Red Blood Count 3.87 


 


Hemoglobin 10.8 


 


Hematocrit 32.2 


 


Mean Corpuscular Volume 83.2 


 


Mean Corpuscular Hemoglobin 27.9 


 


Mean Corpuscular Hemoglobin


Concent 33.5 


 


 


Red Cell Distribution Width 15.9 


 


Platelet Count 209 


 


Mean Platelet Volume 8.4 


 


Neutrophils (%) (Auto) 59.4 


 


Lymphocytes (%) (Auto) 15.3 


 


Monocytes (%) (Auto) 18.1 


 


Eosinophils (%) (Auto) 6.3 


 


Basophils (%) (Auto) 0.9 


 


Neutrophils # (Auto) 3.9 


 


Lymphocytes # (Auto) 1.0 


 


Monocytes # (Auto) 1.2 


 


Eosinophils # (Auto) 0.4 


 


Basophils # (Auto) 0.1 


 


CBC Comment DIFF FINAL 


 


Differential Comment  


 


Blood Urea Nitrogen 51 


 


Creatinine 2.25 


 


Random Glucose 105 


 


Calcium Level 8.3 


 


Magnesium Level 2.5 


 


Sodium Level 148 


 


Potassium Level 4.0 


 


Chloride Level 112 


 


Carbon Dioxide Level 29.7 


 


Anion Gap 6 


 


Estimat Glomerular Filtration


Rate 28 


 


 


Total Creatine Kinase 202 


 


Creatine Kinase MB 1.2 


 


Creatine Kinase MB % 0.6 


 


Troponin I 0.11 














 Date/Time


Source Procedure


Growth Status


 


 


 18 16:18


Nasal Aspirate Influenza Types A,B Antigen (BIANCA) - Final


Positive For Flu A Antigen Complete








Result Diagram:  


18 1618                                                                   

             18 1618








Caprin VTE Risk Assessment


Caprin VTE Risk Assessment:  No/Low Risk (score <= 1)


Caprini Risk Assessment Model











 Point Value = 1          Point Value = 2  Point Value = 3  Point Value = 5


 


Age 41-60


Minor surgery


BMI > 25 kg/m2


Swollen legs


Varicose veins


Pregnancy or postpartum


History of unexplained or recurrent


   spontaneous 


Oral contraceptives or hormone


   replacement


Sepsis (< 1 month)


Serious lung disease, including


   pneumonia (< 1 month)


Abnormal pulmonary function


Acute myocardial infarction


Congestive heart failure (< 1 month)


History of inflammatory bowel disease


Medical patient at bed rest Age 61-74


Arthroscopic surgery


Major open surgery (> 45 min)


Laparoscopic surgery (> 45 min)


Malignancy


Confined to bed (> 72 hours)


Immobilizing plaster cast


Central venous access Age >= 75


History of VTE


Family history of VTE


Factor V Leiden


Prothrombin 98205R


Lupus anticoagulant


Anticardiolipin antibodies


Elevated serum homocysteine


Heparin-induced thrombocytopenia


Other congenital or acquired


   thrombophilia Stroke (< 1 month)


Elective arthroplasty


Hip, pelvis, or leg fracture


Acute spinal cord injury (< 1 month)








Prophylaxis Regimen











   Total Risk


Factor Score Risk Level Prophylaxis Regimen


 


0-1      Low Early ambulation


 


2 Moderate Order ONE of the following:


*Sequential Compression Device (SCD)


*Heparin 5000 units SQ BID


 


3-4 Higher Order ONE of the following medications:


*Heparin 5000 units SQ TID


*Enoxaparin/Lovenox 40 mg SQ daily (WT < 150 kg, CrCl > 30 mL/min)


*Enoxaparin/Lovenox 30 mg SQ daily (WT < 150 kg, CrCl > 10-29 mL/min)


*Enoxaparin/Lovenox 30 mg SQ BID (WT < 150 kg, CrCl > 30 mL/min)


AND/OR


*Sequential Compression Device (SCD)


 


5 or more Highest Order ONE of the following medications:


*Heparin 5000 units SQ TID (Preferred with Epidurals)


*Enoxaparin/Lovenox 40 mg SQ daily (WT < 150 kg, CrCl > 30 mL/min)


*Enoxaparin/Lovenox 30 mg SQ daily (WT < 150 kg, CrCl > 10-29 mL/min)


*Enoxaparin/Lovenox 30 mg SQ BID (WT < 150 kg, CrCl > 30 mL/min)


AND


*Sequential Compression Device (SCD)











Assessment and Plan


Assessment and Plan


Assessment/plan:





1.  Influenza


Positive for flu a antigen


Tamiflu





2.  Community acquired pneumonia/SOB


Chest x-ray showed minimal patchy densities in the right lower lobe, possible 

infiltrate, personally reviewed


Rocephin/azithromycin


Duo nebs


Supplemental oxygen when necessary





3.  Acute on chronic renal insufficiency


Creatinine 2.25, most recent value for comparison was 0.84 on 


Patient reports a history of chronic kidney disease


IV fluid hydration


Monitor renal function





4.  Elevated troponin


Troponin 0.11


Initial EKG showed NSR with T wave inversion in V5/V6, no ST segment elevations 

or depressions, personally reviewed


Likely secondary to renal insufficiency


ACS rule out pending, serial troponins/EKGs





5.  COPD


Treatment as above


IV steroids





6.  Diabetes mellitus


Holding home oral anti-hyperglycemics


SSI


Monitor blood glucose





7.  Hypertension/hyperlipidemia


Continue home medications





FEN


NS at 100 cc/hr


Heart healthy diet


Electrolytes: monitor and replete prn


SCDs





Physician Certification


2 Midnight Certification Type:  Admission for Inpatient Services


Order for Inpatient Services


The services are ordered in accordance with Medicare regulations or non-

Medicare payer requirements, as applicable.  In the case of services not 

specified as inpatient-only, they are appropriately provided as inpatient 

services in accordance with the 2-midnight benchmark.


Estimated LOS (days):  2


2 days is the estimated time the patient will need to remain in the hospital, 

assuming treatment plan goals are met and no additional complications.


Post-Hospital Plan:  Not yet determined











Dorie Lanier MD 2018 20:32

## 2018-01-31 VITALS
DIASTOLIC BLOOD PRESSURE: 87 MMHG | OXYGEN SATURATION: 95 % | RESPIRATION RATE: 20 BRPM | HEART RATE: 72 BPM | TEMPERATURE: 97.7 F | SYSTOLIC BLOOD PRESSURE: 166 MMHG

## 2018-01-31 VITALS
TEMPERATURE: 98.8 F | RESPIRATION RATE: 18 BRPM | SYSTOLIC BLOOD PRESSURE: 175 MMHG | HEART RATE: 73 BPM | DIASTOLIC BLOOD PRESSURE: 100 MMHG | OXYGEN SATURATION: 95 %

## 2018-01-31 VITALS — HEART RATE: 73 BPM

## 2018-01-31 VITALS
TEMPERATURE: 98.8 F | RESPIRATION RATE: 20 BRPM | SYSTOLIC BLOOD PRESSURE: 186 MMHG | DIASTOLIC BLOOD PRESSURE: 104 MMHG | OXYGEN SATURATION: 95 % | HEART RATE: 93 BPM

## 2018-01-31 VITALS
TEMPERATURE: 97.8 F | OXYGEN SATURATION: 94 % | DIASTOLIC BLOOD PRESSURE: 90 MMHG | HEART RATE: 67 BPM | SYSTOLIC BLOOD PRESSURE: 165 MMHG | RESPIRATION RATE: 20 BRPM

## 2018-01-31 VITALS — HEART RATE: 77 BPM

## 2018-01-31 VITALS — OXYGEN SATURATION: 96 %

## 2018-01-31 LAB
BASOPHILS # BLD AUTO: 0 TH/MM3 (ref 0–0.2)
BASOPHILS NFR BLD: 0.5 % (ref 0–2)
BUN SERPL-MCNC: 43 MG/DL (ref 7–18)
CALCIUM SERPL-MCNC: 8.3 MG/DL (ref 8.5–10.1)
CHLORIDE SERPL-SCNC: 112 MEQ/L (ref 98–107)
CREAT SERPL-MCNC: 1.93 MG/DL (ref 0.5–1)
EOSINOPHIL # BLD: 0 TH/MM3 (ref 0–0.4)
EOSINOPHIL NFR BLD: 0.1 % (ref 0–4)
ERYTHROCYTE [DISTWIDTH] IN BLOOD BY AUTOMATED COUNT: 15.9 % (ref 11.6–17.2)
GFR SERPLBLD BASED ON 1.73 SQ M-ARVRAT: 33 ML/MIN (ref 89–?)
GLUCOSE SERPL-MCNC: 183 MG/DL (ref 74–106)
HCO3 BLD-SCNC: 24.2 MEQ/L (ref 21–32)
HCT VFR BLD CALC: 30.5 % (ref 35–46)
HGB BLD-MCNC: 10.1 GM/DL (ref 11.6–15.3)
LYMPHOCYTES # BLD AUTO: 0.7 TH/MM3 (ref 1–4.8)
LYMPHOCYTES NFR BLD AUTO: 12.3 % (ref 9–44)
MCH RBC QN AUTO: 27.2 PG (ref 27–34)
MCHC RBC AUTO-ENTMCNC: 33.1 % (ref 32–36)
MCV RBC AUTO: 82.1 FL (ref 80–100)
MONOCYTE #: 0.3 TH/MM3 (ref 0–0.9)
MONOCYTES NFR BLD: 6.1 % (ref 0–8)
NEUTROPHILS # BLD AUTO: 4.5 TH/MM3 (ref 1.8–7.7)
NEUTROPHILS NFR BLD AUTO: 81 % (ref 16–70)
PLATELET # BLD: 185 TH/MM3 (ref 150–450)
PMV BLD AUTO: 8.3 FL (ref 7–11)
RBC # BLD AUTO: 3.71 MIL/MM3 (ref 4–5.3)
SODIUM SERPL-SCNC: 143 MEQ/L (ref 136–145)
TROPONIN I SERPL-MCNC: 0.11 NG/ML (ref 0.02–0.05)
WBC # BLD AUTO: 5.5 TH/MM3 (ref 4–11)

## 2018-01-31 RX ADMIN — ASPIRIN 81 MG SCH MG: 81 TABLET ORAL at 09:34

## 2018-01-31 RX ADMIN — NIFEDIPINE SCH MG: 60 TABLET, FILM COATED, EXTENDED RELEASE ORAL at 09:33

## 2018-01-31 RX ADMIN — ACYCLOVIR SCH UNITS: 800 TABLET ORAL at 17:58

## 2018-01-31 RX ADMIN — INSULIN ASPART SCH: 100 INJECTION, SOLUTION INTRAVENOUS; SUBCUTANEOUS at 17:00

## 2018-01-31 RX ADMIN — THIAMINE HYDROCHLORIDE SCH MLS/HR: 100 INJECTION, SOLUTION INTRAMUSCULAR; INTRAVENOUS at 16:30

## 2018-01-31 RX ADMIN — HEPARIN SODIUM SCH UNITS: 10000 INJECTION, SOLUTION INTRAVENOUS; SUBCUTANEOUS at 14:26

## 2018-01-31 RX ADMIN — INSULIN ASPART SCH: 100 INJECTION, SOLUTION INTRAVENOUS; SUBCUTANEOUS at 22:23

## 2018-01-31 RX ADMIN — PHENYTOIN SODIUM SCH MLS/HR: 50 INJECTION INTRAMUSCULAR; INTRAVENOUS at 06:30

## 2018-01-31 RX ADMIN — Medication SCH ML: at 21:00

## 2018-01-31 RX ADMIN — SODIUM CHLORIDE SCH MLS/HR: 900 INJECTION INTRAVENOUS at 17:52

## 2018-01-31 RX ADMIN — AZITHROMYCIN SCH MLS/HR: 500 INJECTION, POWDER, LYOPHILIZED, FOR SOLUTION INTRAVENOUS at 18:42

## 2018-01-31 RX ADMIN — HEPARIN SODIUM SCH UNITS: 10000 INJECTION, SOLUTION INTRAVENOUS; SUBCUTANEOUS at 05:24

## 2018-01-31 RX ADMIN — HEPARIN SODIUM SCH UNITS: 10000 INJECTION, SOLUTION INTRAVENOUS; SUBCUTANEOUS at 22:10

## 2018-01-31 RX ADMIN — IPRATROPIUM BROMIDE AND ALBUTEROL SULFATE PRN AMPULE: .5; 3 SOLUTION RESPIRATORY (INHALATION) at 19:45

## 2018-01-31 RX ADMIN — IPRATROPIUM BROMIDE AND ALBUTEROL SULFATE PRN AMPULE: .5; 3 SOLUTION RESPIRATORY (INHALATION) at 14:18

## 2018-01-31 RX ADMIN — OSELTAMIVIR PHOSPHATE SCH MG: 6 POWDER, FOR SUSPENSION ORAL at 22:09

## 2018-01-31 RX ADMIN — INSULIN ASPART SCH: 100 INJECTION, SOLUTION INTRAVENOUS; SUBCUTANEOUS at 12:00

## 2018-01-31 RX ADMIN — OSELTAMIVIR PHOSPHATE SCH MG: 6 POWDER, FOR SUSPENSION ORAL at 09:00

## 2018-01-31 RX ADMIN — Medication SCH ML: at 09:00

## 2018-01-31 RX ADMIN — METHYLPREDNISOLONE SODIUM SUCCINATE SCH MG: 40 INJECTION, POWDER, FOR SOLUTION INTRAMUSCULAR; INTRAVENOUS at 14:26

## 2018-01-31 RX ADMIN — METHYLPREDNISOLONE SODIUM SUCCINATE SCH MG: 40 INJECTION, POWDER, FOR SOLUTION INTRAMUSCULAR; INTRAVENOUS at 05:24

## 2018-01-31 RX ADMIN — ZOLPIDEM TARTRATE PRN MG: 10 TABLET, FILM COATED ORAL at 22:10

## 2018-01-31 RX ADMIN — INSULIN ASPART SCH: 100 INJECTION, SOLUTION INTRAVENOUS; SUBCUTANEOUS at 08:00

## 2018-01-31 NOTE — EKG
Date Performed: 01/30/2018       Time Performed: 16:25:27

 

PTAGE:      53 years

 

EKG:      Sinus rhythm 

 

 POSSIBLE LEFT ATRIAL ENLARGEMENT MODERATE T-WAVE ABNORMALITY, CONSIDER LATERAL ISCHEMIA ABNORMAL ECG


 

PREVIOUS TRACING       : 08/15/2016 07.30 Since the prior tracing, nonspecific T-wave changes are mor
e prominent

 

DOCTOR:   Jacob White  Interpretating Date/Time  01/31/2018 09:41:36

## 2018-01-31 NOTE — EKG
Date Performed: 01/31/2018       Time Performed: 03:13:46

 

PTAGE:      53 years

 

EKG:      Sinus rhythm 

 

 Extensive ST-T changes are nonspecific Borderline ECG Since the prior tracing, there has been no sig
nificant change 

 

 PREVIOUS TRACING            : 01/30/2018 21.30

 

DOCTOR:   Jacob White  Interpretating Date/Time  01/31/2018 09:43:21

## 2018-01-31 NOTE — EKG
Date Performed: 01/30/2018       Time Performed: 21:30:29

 

PTAGE:      53 years

 

EKG:      Sinus rhythm 

 

 POSSIBLE LEFT ATRIAL ENLARGEMENT NONSPECIFIC T-WAVE ABNORMALITY BORDERLINE ECG Since the prior sandie
ng, there has been no significant change 

 

 PREVIOUS TRACING            : 01/30/2018 16.25

 

DOCTOR:   Jacob White  Interpretating Date/Time  01/31/2018 09:43:30

## 2018-01-31 NOTE — HHI.PR
Subjective


Remarks


The pt said she felt a lot better. She said she had kidney problems. She has 

been eating and ambulating. Discussed with family and nursing.





Objective


Vitals





Vital Signs








  Date Time  Temp Pulse Resp B/P (MAP) Pulse Ox O2 Delivery O2 Flow Rate FiO2


 


1/31/18 12:00  56      


 


1/31/18 08:00     96 Room Air  21


 


1/31/18 08:00  93      


 


1/31/18 04:00 98.8 73 18 175/100 (125) 95   


 


1/31/18 03:56  73      


 


1/30/18 23:58  86      


 


1/30/18 23:13      Room Air  


 


1/30/18 21:11     95   


 


1/30/18 20:38        


 


1/30/18 20:00 98.6 80 18 169/94 (119) 92   


 


1/30/18 19:55  77 18 179/88 (118) 97 Room Air  


 


1/30/18 19:55     97   21














I/O      


 


 1/30/18 1/30/18 1/30/18 1/31/18 1/31/18 1/31/18





 07:00 15:00 23:00 07:00 15:00 23:00


 


Intake Total   1375 ml 617 ml  


 


Balance   1375 ml 617 ml  


 


      


 


Intake IV Total   1375 ml 617 ml  


 


# Voids    3  








Result Diagram:  


1/31/18 0710                                                                   

             1/31/18 0710





Imaging





Last Impressions








Chest X-Ray 1/30/18 1536 Signed





Impressions: 





 Service Date/Time:  Tuesday, January 30, 2018 15:46 - CONCLUSION:  Minimal 





 patchy densities right lower lobe could be infiltrate..     Sarwat Villanueva MD 








Objective Remarks


GENERAL: Resting comfortably.


SKIN: No rashes, ecchymoses or lesions. Cool and dry.


HEAD: Atraumatic. Normocephalic. No temporal or scalp tenderness.


EYES: Pupils equal round and reactive. Extraocular motions intact. No scleral 

icterus. No injection or drainage. 


ENT: Nose without bleeding, purulent drainage or septal hematoma. Throat 

without erythema, tonsillar hypertrophy or exudate. Uvula midline. Airway 

patent.


NECK: Trachea midline. No JVD or lymphadenopathy. Supple, nontender, no 

meningeal signs.


CARDIOVASCULAR: Regular rate and rhythm without murmurs, gallops, or rubs. 


RESPIRATORY: Clear to auscultation. Breath sounds equal bilaterally. No wheezes

, rales, or rhonchi.  Poor air movement.


GASTROINTESTINAL: Abdomen soft, non-tender, nondistended. No hepato-splenomegaly

, or palpable masses. No guarding.


MUSCULOSKELETAL: Extremities without clubbing, cyanosis, or edema. No joint 

tenderness, effusion, or edema noted. 


NEUROLOGICAL: Awake and alert. Cranial nerves II through XII intact.  Motor and 

sensory grossly within normal limits. Normal speech.


PSYCH: Mood and affect appropriate.


Medications and IVs





Current Medications








 Medications


  (Trade)  Dose


 Ordered  Sig/Danielle


 Route  Start Time


 Stop Time Status Last Admin


 


  (NS Flush)  2 ml  UNSCH  PRN


 IV FLUSH  1/30/18 19:15


     


 


 


  (NS Flush)  2 ml  BID


 IV FLUSH  1/30/18 21:00


    1/31/18 09:00


 


 


 Ceftriaxone


 Sodium 1000 mg/


 Sodium Chloride  100 ml @ 


 200 mls/hr  Q24H


 IV  1/31/18 18:00


     


 


 


 Azithromycin 500


 mg/Sodium Chloride  250 ml @ 


 250 mls/hr  Q24H


 IV  1/31/18 18:00


     


 


 


  (Duoneb Neb)  1 ampule  Q4HR NEB  PRN


 INH  1/30/18 19:15


    1/31/18 14:18


 


 


  (Heparin Inj)  5,000 units  Q8H


 SQ  1/30/18 22:00


    1/31/18 14:26


 


 


  (SoluMEDROL INJ)  40 mg  Q8HR


 IV PUSH  1/31/18 06:00


    1/31/18 14:26


 


 


  (Aspirin Chew)  81 mg  DAILY


 CHEW  1/31/18 09:00


    1/31/18 09:34


 


 


  (Procardia Xl)  60 mg  DAILY


 PO  1/31/18 09:00


    1/31/18 09:33


 


 


  (Ambien)  10 mg  HS  PRN


 PO  1/30/18 19:15


    1/30/18 23:05


 


 


  (D50w (Vial) Inj)  50 ml  UNSCH  PRN


 IV PUSH  1/30/18 19:15


     


 


 


  (Glucagon Inj)  1 mg  UNSCH  PRN


 OTHER  1/30/18 19:15


     


 


 


  (NovoLOG


 SUPPLEMENTAL


 SCALE)  1  ACHS SLIDING  SCALE


 SQ  1/30/18 21:00


    1/31/18 12:00


 


 


  (Tamiflu Liq)  30 mg  BID


 PO  1/30/18 21:00


    1/31/18 09:00


 


 


  (Catapres)  0.1 mg  Q6H  PRN


 PO  1/31/18 02:30


    1/31/18 11:34


 


 


 Sodium Chloride  1,000 ml @ 


 100 mls/hr  Q10H


 IV  1/31/18 16:30


   UNV  


 











A/P


Assessment and Plan


Influenza


Positive for flu a antigen.


- Tamiflu.





Community acquired pneumonia/ SOB


Chest x-ray showed minimal patchy densities in the right lower lobe, possible 

infiltrate. Also with flu as above.


- Rocephin/azithromycin IV.


- Duo nebs.


- Supplemental oxygen when necessary.





Acute on chronic renal insufficiency


Creatinine 2.25 on admission. Patient reports a history of chronic kidney 

disease s/t DM.


- IV fluid hydration.


- Monitor renal function.





Elevated troponin


Troponin 0.11. Initial EKG showed NSR with T wave inversion in V5/V6, no ST 

segment elevations or depressions. Likely secondary to flu/ PNA/ renal 

insufficiency. No chest pain.


- treatment as above.


- EKG in AM.





Diabetes mellitus


Steroids are increasing values.


- Holding home oral anti-hyperglycemics.


- SSI and Levemir.


- d/c steroids.





Hypertension


Poorly controlled.


- Continue home medications.


- clonidine as needed.





PPx: SCDs











Juma De Jesus DO Jan 31, 2018 16:49

## 2018-02-01 VITALS
OXYGEN SATURATION: 97 % | SYSTOLIC BLOOD PRESSURE: 151 MMHG | HEART RATE: 78 BPM | RESPIRATION RATE: 20 BRPM | DIASTOLIC BLOOD PRESSURE: 96 MMHG | TEMPERATURE: 98 F

## 2018-02-01 VITALS
HEART RATE: 79 BPM | OXYGEN SATURATION: 96 % | RESPIRATION RATE: 20 BRPM | DIASTOLIC BLOOD PRESSURE: 97 MMHG | SYSTOLIC BLOOD PRESSURE: 171 MMHG | TEMPERATURE: 97.6 F

## 2018-02-01 VITALS
DIASTOLIC BLOOD PRESSURE: 80 MMHG | TEMPERATURE: 97.8 F | HEART RATE: 71 BPM | OXYGEN SATURATION: 99 % | SYSTOLIC BLOOD PRESSURE: 150 MMHG | RESPIRATION RATE: 20 BRPM

## 2018-02-01 VITALS
RESPIRATION RATE: 20 BRPM | SYSTOLIC BLOOD PRESSURE: 160 MMHG | HEART RATE: 71 BPM | OXYGEN SATURATION: 96 % | TEMPERATURE: 97.6 F | DIASTOLIC BLOOD PRESSURE: 90 MMHG

## 2018-02-01 VITALS
TEMPERATURE: 97.8 F | SYSTOLIC BLOOD PRESSURE: 162 MMHG | DIASTOLIC BLOOD PRESSURE: 89 MMHG | OXYGEN SATURATION: 98 % | HEART RATE: 77 BPM | RESPIRATION RATE: 20 BRPM

## 2018-02-01 VITALS — HEART RATE: 68 BPM

## 2018-02-01 VITALS
OXYGEN SATURATION: 96 % | TEMPERATURE: 97 F | DIASTOLIC BLOOD PRESSURE: 98 MMHG | HEART RATE: 66 BPM | RESPIRATION RATE: 20 BRPM | SYSTOLIC BLOOD PRESSURE: 161 MMHG

## 2018-02-01 VITALS — OXYGEN SATURATION: 98 %

## 2018-02-01 VITALS — HEART RATE: 72 BPM

## 2018-02-01 VITALS — HEART RATE: 69 BPM

## 2018-02-01 VITALS — HEART RATE: 78 BPM

## 2018-02-01 VITALS — OXYGEN SATURATION: 96 %

## 2018-02-01 LAB
BUN SERPL-MCNC: 40 MG/DL (ref 7–18)
CALCIUM SERPL-MCNC: 8 MG/DL (ref 8.5–10.1)
CHLORIDE SERPL-SCNC: 112 MEQ/L (ref 98–107)
COLOR UR: (no result)
CREAT SERPL-MCNC: 2.15 MG/DL (ref 0.5–1)
ERYTHROCYTE [DISTWIDTH] IN BLOOD BY AUTOMATED COUNT: 16.1 % (ref 11.6–17.2)
GFR SERPLBLD BASED ON 1.73 SQ M-ARVRAT: 29 ML/MIN (ref 89–?)
GLUCOSE SERPL-MCNC: 76 MG/DL (ref 74–106)
GLUCOSE UR STRIP-MCNC: 300 MG/DL
HCO3 BLD-SCNC: 25 MEQ/L (ref 21–32)
HCT VFR BLD CALC: 28.8 % (ref 35–46)
HGB BLD-MCNC: 9.5 GM/DL (ref 11.6–15.3)
HGB UR QL STRIP: (no result)
KETONES UR STRIP-MCNC: (no result) MG/DL
MAGNESIUM SERPL-MCNC: 2.2 MG/DL (ref 1.5–2.5)
MCH RBC QN AUTO: 27 PG (ref 27–34)
MCHC RBC AUTO-ENTMCNC: 32.9 % (ref 32–36)
MCV RBC AUTO: 82.3 FL (ref 80–100)
NITRITE UR QL STRIP: (no result)
PLATELET # BLD: 186 TH/MM3 (ref 150–450)
PMV BLD AUTO: 8.3 FL (ref 7–11)
RBC # BLD AUTO: 3.5 MIL/MM3 (ref 4–5.3)
SODIUM SERPL-SCNC: 145 MEQ/L (ref 136–145)
SODIUM,RANDOM URINE: 71 MEQ/L
SP GR UR STRIP: 1.01 (ref 1–1.03)
SQUAMOUS #/AREA URNS HPF: 1 /HPF (ref 0–5)
URINE LEUKOCYTE ESTERASE: (no result)
WBC # BLD AUTO: 4.9 TH/MM3 (ref 4–11)

## 2018-02-01 RX ADMIN — THIAMINE HYDROCHLORIDE SCH MLS/HR: 100 INJECTION, SOLUTION INTRAMUSCULAR; INTRAVENOUS at 05:42

## 2018-02-01 RX ADMIN — INSULIN ASPART SCH: 100 INJECTION, SOLUTION INTRAVENOUS; SUBCUTANEOUS at 08:00

## 2018-02-01 RX ADMIN — THIAMINE HYDROCHLORIDE SCH MLS/HR: 100 INJECTION, SOLUTION INTRAMUSCULAR; INTRAVENOUS at 12:08

## 2018-02-01 RX ADMIN — INSULIN ASPART SCH: 100 INJECTION, SOLUTION INTRAVENOUS; SUBCUTANEOUS at 12:00

## 2018-02-01 RX ADMIN — THIAMINE HYDROCHLORIDE SCH MLS/HR: 100 INJECTION, SOLUTION INTRAMUSCULAR; INTRAVENOUS at 21:24

## 2018-02-01 RX ADMIN — ASPIRIN 81 MG SCH MG: 81 TABLET ORAL at 08:44

## 2018-02-01 RX ADMIN — IPRATROPIUM BROMIDE AND ALBUTEROL SULFATE PRN AMPULE: .5; 3 SOLUTION RESPIRATORY (INHALATION) at 02:55

## 2018-02-01 RX ADMIN — AZITHROMYCIN SCH MLS/HR: 500 INJECTION, POWDER, LYOPHILIZED, FOR SOLUTION INTRAVENOUS at 16:11

## 2018-02-01 RX ADMIN — Medication SCH ML: at 08:47

## 2018-02-01 RX ADMIN — SODIUM CHLORIDE SCH MLS/HR: 900 INJECTION INTRAVENOUS at 18:14

## 2018-02-01 RX ADMIN — NIFEDIPINE SCH MG: 60 TABLET, FILM COATED, EXTENDED RELEASE ORAL at 08:44

## 2018-02-01 RX ADMIN — IPRATROPIUM BROMIDE AND ALBUTEROL SULFATE PRN AMPULE: .5; 3 SOLUTION RESPIRATORY (INHALATION) at 20:48

## 2018-02-01 RX ADMIN — HEPARIN SODIUM SCH UNITS: 10000 INJECTION, SOLUTION INTRAVENOUS; SUBCUTANEOUS at 12:03

## 2018-02-01 RX ADMIN — HEPARIN SODIUM SCH UNITS: 10000 INJECTION, SOLUTION INTRAVENOUS; SUBCUTANEOUS at 05:44

## 2018-02-01 RX ADMIN — INSULIN ASPART SCH: 100 INJECTION, SOLUTION INTRAVENOUS; SUBCUTANEOUS at 21:24

## 2018-02-01 RX ADMIN — ZOLPIDEM TARTRATE PRN MG: 10 TABLET, FILM COATED ORAL at 21:24

## 2018-02-01 RX ADMIN — ACYCLOVIR SCH UNITS: 800 TABLET ORAL at 08:45

## 2018-02-01 RX ADMIN — INSULIN ASPART SCH: 100 INJECTION, SOLUTION INTRAVENOUS; SUBCUTANEOUS at 16:11

## 2018-02-01 RX ADMIN — IPRATROPIUM BROMIDE AND ALBUTEROL SULFATE PRN AMPULE: .5; 3 SOLUTION RESPIRATORY (INHALATION) at 11:17

## 2018-02-01 RX ADMIN — IPRATROPIUM BROMIDE AND ALBUTEROL SULFATE PRN AMPULE: .5; 3 SOLUTION RESPIRATORY (INHALATION) at 09:02

## 2018-02-01 RX ADMIN — IPRATROPIUM BROMIDE AND ALBUTEROL SULFATE PRN AMPULE: .5; 3 SOLUTION RESPIRATORY (INHALATION) at 16:16

## 2018-02-01 RX ADMIN — OSELTAMIVIR PHOSPHATE SCH MG: 6 POWDER, FOR SUSPENSION ORAL at 21:23

## 2018-02-01 RX ADMIN — HEPARIN SODIUM SCH UNITS: 10000 INJECTION, SOLUTION INTRAVENOUS; SUBCUTANEOUS at 21:23

## 2018-02-01 RX ADMIN — Medication SCH ML: at 21:00

## 2018-02-01 RX ADMIN — OSELTAMIVIR PHOSPHATE SCH MG: 6 POWDER, FOR SUSPENSION ORAL at 08:47

## 2018-02-01 NOTE — RADRPT
EXAM DATE/TIME:  2018 12:43 

 

[facility] COMPARISON:     

CT ABDOMEN & PELVIS W CONTRAST, May 22, 2012, 8:32.

 

EXTERNAL COMPARISON :    

[facility], US KIDNEY, BILATERAL, 2017

 

 

INDICATIONS :     

Increased BUN/creatinine. 

                     

 

MEDICAL HISTORY :     

Hypercholesterolemia. Gastroparesis. Arthritis. CVA. Hyperlipidemia. HTN. COPD. Pneumonia. Dyspnea. G
ERD.  Chronic kidney disease. Gout. Diabetes. Anemia. 

 

SURGICAL HISTORY :     

Tubal ligation.  section.   Cardiac cath. Blood transfusions. Left breast surgery x2. 

 

ENCOUNTER:     

Initial

 

ACUITY:     

1 day

 

PAIN SCORE:     

3/10

 

LOCATION:     

Bilateral flank 

MEASUREMENTS:     

 

RIGHT KIDNEY:     

14.0 x 5.3 x 7.6 cm

 

LEFT KIDNEY:     

10.9 x 5.0 x 5.5 cm

 

FINDINGS:     

 

RIGHT KIDNEY:     

Diffusely increased cortical echogenicity without stone, mass or significant hydronephrosis. Very sub
tle perinephric free fluid near the superior pole.

 

LEFT KIDNEY:     

Diffusely increased cortical echogenicity without stone, mass or significant hydronephrosis.

 

BLADDER:     

Within normal limits given the degree of distension.  

 

CONCLUSION:     

1. Echogenic kidneys consistent with medical renal disease.

2. No obstructive uropathy.

3. Very subtle trace nonspecific perinephric fluid near the superior pole of the right kidney. 

 

 

 Michael Ingram MD on 2018 at 15:08           

Board Certified Radiologist.

 This report was verified electronically.

## 2018-02-01 NOTE — HHI.PR
Subjective


Remarks


The patient's that she had some leg swelling yesterday that has since resolved.

  She said her breathing was better.  She said she was worried about her 

kidneys.  She said she has been urinating normally.  No other acute complaints.





Objective


Vitals





Vital Signs








  Date Time  Temp Pulse Resp B/P (MAP) Pulse Ox O2 Delivery O2 Flow Rate FiO2


 


2/1/18 09:19      Room Air  


 


2/1/18 09:02     98   21


 


2/1/18 08:00  71      


 


2/1/18 04:05  69      


 


2/1/18 04:00      Room Air  


 


2/1/18 04:00 97.0 66 20 161/98 (119) 96   


 


2/1/18 00:01  68      


 


2/1/18 00:00      Room Air  


 


2/1/18 00:00 98.0 78 20 151/96 (114) 97   


 


1/31/18 20:00      Room Air  


 


1/31/18 20:00  77      


 


1/31/18 19:49     96   21


 


1/31/18 16:00  63      


 


1/31/18 16:00 97.8 67 20 165/90 (115) 94   


 


1/31/18 12:00  56      


 


1/31/18 12:00 97.7 72 20 166/87 (113) 95   














I/O      


 


 1/31/18 1/31/18 1/31/18 2/1/18 2/1/18 2/1/18





 07:00 15:00 23:00 07:00 15:00 23:00


 


Intake Total 617 ml  1430 ml 1512 ml  


 


Output Total    400 ml  


 


Balance 617 ml  1430 ml 1112 ml  


 


      


 


Intake Oral   1080 ml 500 ml  


 


IV Total 617 ml  350 ml 1012 ml  


 


Output Urine Total    400 ml  


 


# Voids 3  3   


 


# Bowel Movements   2 0  








Result Diagram:  


2/1/18 0748                                                                    

            2/1/18 0748





Imaging





Last Impressions








Chest X-Ray 1/30/18 1536 Signed





Impressions: 





 Service Date/Time:  Tuesday, January 30, 2018 15:46 - CONCLUSION:  Minimal 





 patchy densities right lower lobe could be infiltrate..     Sarwat Villanueva MD 








Objective Remarks


GENERAL: Resting comfortably.


SKIN: No rashes, ecchymoses or lesions. Cool and dry.


HEAD: Atraumatic. Normocephalic. No temporal or scalp tenderness.


EYES: Pupils equal round and reactive. Extraocular motions intact. No scleral 

icterus. No injection or drainage. 


ENT: Nose without bleeding, purulent drainage or septal hematoma. Throat 

without erythema, tonsillar hypertrophy or exudate. Uvula midline. Airway 

patent.


NECK: Trachea midline. No JVD or lymphadenopathy. Supple, nontender, no 

meningeal signs.


CARDIOVASCULAR: Regular rate and rhythm without murmurs, gallops, or rubs. 


RESPIRATORY: Clear to auscultation. Breath sounds equal bilaterally. No wheezes

, rales, or rhonchi.  Poor air movement.


GASTROINTESTINAL: Abdomen soft, non-tender, nondistended. No hepato-splenomegaly

, or palpable masses. No guarding.


MUSCULOSKELETAL: Extremities without clubbing, cyanosis, or edema. No joint 

tenderness, effusion, or edema noted. 


NEUROLOGICAL: Awake and alert. Cranial nerves II through XII intact.  Motor and 

sensory grossly within normal limits. Normal speech.


PSYCH: Mood and affect appropriate.


Medications and IVs





Current Medications








 Medications


  (Trade)  Dose


 Ordered  Sig/Danielle


 Route  Start Time


 Stop Time Status Last Admin


 


  (NS Flush)  2 ml  UNSCH  PRN


 IV FLUSH  1/30/18 19:15


     


 


 


  (NS Flush)  2 ml  BID


 IV FLUSH  1/30/18 21:00


    1/31/18 09:00


 


 


 Ceftriaxone


 Sodium 1000 mg/


 Sodium Chloride  100 ml @ 


 200 mls/hr  Q24H


 IV  1/31/18 18:00


    1/31/18 17:52


 


 


 Azithromycin 500


 mg/Sodium Chloride  250 ml @ 


 250 mls/hr  Q24H


 IV  1/31/18 18:00


    1/31/18 18:42


 


 


  (Duoneb Neb)  1 ampule  Q4HR NEB  PRN


 INH  1/30/18 19:15


    2/1/18 09:02


 


 


  (Heparin Inj)  5,000 units  Q8H


 SQ  1/30/18 22:00


    2/1/18 05:44


 


 


  (Aspirin Chew)  81 mg  DAILY


 CHEW  1/31/18 09:00


    2/1/18 08:44


 


 


  (Procardia Xl)  60 mg  DAILY


 PO  1/31/18 09:00


    2/1/18 08:44


 


 


  (Ambien)  10 mg  HS  PRN


 PO  1/30/18 19:15


    1/31/18 22:10


 


 


  (D50w (Vial) Inj)  50 ml  UNSCH  PRN


 IV PUSH  1/30/18 19:15


     


 


 


  (Glucagon Inj)  1 mg  UNSCH  PRN


 OTHER  1/30/18 19:15


     


 


 


  (NovoLOG


 SUPPLEMENTAL


 SCALE)  1  ACHS SLIDING  SCALE


 SQ  1/30/18 21:00


    1/31/18 22:23


 


 


  (Tamiflu Liq)  30 mg  BID


 PO  1/30/18 21:00


    2/1/18 08:47


 


 


  (Catapres)  0.1 mg  Q6H  PRN


 PO  1/31/18 02:30


    2/1/18 05:41


 


 


 Sodium Chloride  1,000 ml @ 


 100 mls/hr  Q10H


 IV  1/31/18 16:30


    2/1/18 05:42


 


 


  (Levemir Inj)  10 units  DAILY


 SQ  1/31/18 16:30


    2/1/18 08:45


 


 


  (K-Lyte Cl  Eff)  25 meq  ONCE  ONCE


 PO  2/1/18 10:30


 2/1/18 10:31 UNV  


 











A/P


Assessment and Plan


Influenza


Positive for flu A antigen.


- continue Tamiflu, renally dosed.





Community acquired pneumonia/ SOB


Chest x-ray showed minimal patchy densities in the right lower lobe, possible 

infiltrate. Also with flu as above.


- Rocephin/azithromycin IV.


- Duo nebs.


- Supplemental oxygen when necessary.





Acute on chronic renal insufficiency


Creatinine 2.25 on admission. Patient reports a history of chronic kidney 

disease s/t DM. 


- IV fluid hydration.


- Monitor renal function.


- check UA, calculate FENa. 


- nephrology consult requested. 


- replete potassium. 





Elevated troponin


Troponin 0.11. Initial EKG showed NSR with T wave inversion in V5/V6, no ST 

segment elevations or depressions. Likely secondary to flu/ PNA/ renal 

insufficiency. No chest pain.


- treatment as above.


- repeat EKG 2/1.





Diabetes mellitus


Steroids are increasing values.


- Holding home oral anti-hyperglycemics.


- SSI and Levemir.


- d/c steroids.





Hypertension


Poorly controlled.


- Continue home medications.


- clonidine as needed.





Anemia


Likely s/t renal disease. 


- follow CBC and transfuse as needed.





PPx: SCDs


Discharge Planning


Awaiting nephrology eval. Hopefully d/c home in [place].











Juma De Jesus DO Feb 1, 2018 10:31

## 2018-02-01 NOTE — PD.CONS
[facility]


Service


Nephrology


Consult Requested By





Reason for Consult


Acute on CKD


Primary Care Physician


Barry James M.D.


History of Present Illness


This is our 52 y/o office patient. She has a hx of DM II, HTN, COPD (former 

smoker), and hyperlipidemia. She presented and was admitted to the hospital on  following 3 days of progressively worsening shortness of breath, fatigue, 

fever/body aches. She tested positive for Influenza A, started on Tamiflu. 

Chest Xray also suggests RLL pneumonia, started on IV Rocephin and 

azithromycin. In May of 2016, her creatinine was 0.87. In May of 2017, it naveen 

to 1.02, GFR 73. She was seen in January in CKD clinic. At that time her 

creatinine naveen to 1.57, GFR fell to 43. In addition she was spilling over 4 

grams of protein in the urine. We had sent off for serum electrophoresis, 

results not available. It was thought she had diabetic nephropathy, she was on 

ARB. This admission her creatinine was 2.25, improved to 1.93 with IVF, is 2.15 

today. She is non oliguric. There was a question of renal stones last year, but 

she was seen by urology and that had resolved. We were consulted for renal 

management. She is on isolation, not in distress. 


 (Kylah Leslie)





Review of Systems


Constitutional:  COMPLAINS OF: Fatigue, Fever, Chills, Change in appetite


Respiratory:  COMPLAINS OF: Cough, Wheezing, Sputum production, Shortness of 

breath


Cardiovascular:  DENIES: Chest pain, Lower Extremity Edema


Gastrointestinal:  COMPLAINS OF: Nausea, DENIES: Abdominal pain (Kylah Leslie)





Past Family Social History


Allergies:  


Coded Allergies:  


     levofloxacin (Unverified  Allergy, Severe, ITCHING, 18)


 OCCURRED THIS FIRST DOSE; ONLY RECEIVED 1/2


Past Medical History


CKD 3, baseline creatinine 1.57, GFR 43 in Dec 2017


COPD


Diabetes mellitus


Hypertension


Hyperlipidemia


Past Surgical History





Reported Medications


Glipizide 10 Mg Tab 10 Mg PO BIDAC


     Take 30 minutes before a meal


Zolpidem (Zolpidem Tartrate) 10 Mg Tab 10 Mg PO HS PRN


Vitamin C (Ascorbic Acid) 250 Mg Tab 500 Mg PO DAILY


Aspirin 81 Mg Chew 81 Mg CHEW DAILY


Nifedipine ER 24 HR (Nifedipine) 60 Mg Tab 60 Mg PO DAILY


Losartan (Losartan Potassium) 100 Mg Tab 100 Mg PO DAILY


Metformin (Metformin HCl) 1,000 Mg Tab 1,000 Mg PO DAILY


     With a meal


D3 Super Strength (Cholecalciferol) 2,000 Unit Cap 1,000 Units PO DAILY


Omega-3 Fish Oil/Vitamin (Fish Oil-Cholecalciferol) 1,000-1,000 Mg Cap 1 Cap PO 

DAILY


Active Ordered Medications





Current Medications








 Medications


  (Trade)  Dose


 Ordered  Sig/Danielle


 Route  Start Time


 Stop Time Status Last Admin


 


  (NS Flush)  2 ml  UNSCH  PRN


 IV FLUSH  18 19:15


     


 


 


  (NS Flush)  2 ml  BID


 IV FLUSH  18 21:00


    18 09:00


 


 


 Ceftriaxone


 Sodium 1000 mg/


 Sodium Chloride  100 ml @ 


 200 mls/hr  Q24H


 IV  18 18:00


    18 17:52


 


 


 Azithromycin 500


 mg/Sodium Chloride  250 ml @ 


 250 mls/hr  Q24H


 IV  18 18:00


    18 18:42


 


 


  (Duoneb Neb)  1 ampule  Q4HR NEB  PRN


 INH  18 19:15


    18 11:17


 


 


  (Heparin Inj)  5,000 units  Q8H


 SQ  18 22:00


    18 05:44


 


 


  (Aspirin Chew)  81 mg  DAILY


 CHEW  18 09:00


    18 08:44


 


 


  (Procardia Xl)  60 mg  DAILY


 PO  18 09:00


    18 08:44


 


 


  (Ambien)  10 mg  HS  PRN


 PO  18 19:15


    18 22:10


 


 


  (D50w (Vial) Inj)  50 ml  UNSCH  PRN


 IV PUSH  18 19:15


     


 


 


  (Glucagon Inj)  1 mg  UNSCH  PRN


 OTHER  18 19:15


     


 


 


  (NovoLOG


 SUPPLEMENTAL


 SCALE)  1  ACHS SLIDING  SCALE


 SQ  18 21:00


    18 22:23


 


 


  (Tamiflu Liq)  30 mg  BID


 PO  18 21:00


    18 08:47


 


 


  (Catapres)  0.1 mg  Q6H  PRN


 PO  18 02:30


    18 05:41


 


 


 Sodium Chloride  1,000 ml @ 


 50 mls/hr  Q20H


 IV  18 16:30


    18 05:42


 


 


  (Levemir Inj)  10 units  DAILY


 SQ  18 16:30


    18 08:45


 








Family History


Non contributory


Social History


Lives with boyfriend


Recently stopped smoking


Denies illicit drug use


Full code 


 (Kylah Leslie)





Physical Exam


Vital Signs





Vital Signs








  Date Time  Temp Pulse Resp B/P (MAP) Pulse Ox O2 Delivery O2 Flow Rate FiO2


 


18 09:19      Room Air  


 


18 09:02     98   21


 


18 08:00  71      


 


18 04:05  69      


 


18 04:00      Room Air  


 


18 04:00 97.0 66 20 161/98 (119) 96   


 


18 00:01  68      


 


18 00:00      Room Air  


 


18 00:00 98.0 78 20 151/96 (114) 97   


 


18 20:00      Room Air  


 


18 20:00  77      


 


18 19:49     96   21


 


18 16:00  63      


 


18 16:00 97.8 67 20 165/90 (115) 94   


 


18 12:00  56      


 


18 12:00 97.7 72 20 166/87 (113) 95   








Physical Exam


Middle aged AAF sitting up


Awake, alert, oriented


Lungs with trace crackles right lower field


S1/S2, RRR no murmurs


Ext no edema


Abd soft


Laboratory





Laboratory Tests








Test


  18


13:01 18


07:48


 


Total Creatine Kinase 302  


 


Creatine Kinase MB 1.2  


 


Creatine Kinase MB % 0.4  


 


White Blood Count  4.9 


 


Red Blood Count  3.50 


 


Hemoglobin  9.5 


 


Hematocrit  28.8 


 


Mean Corpuscular Volume  82.3 


 


Mean Corpuscular Hemoglobin  27.0 


 


Mean Corpuscular Hemoglobin


Concent 


  32.9 


 


 


Red Cell Distribution Width  16.1 


 


Platelet Count  186 


 


Mean Platelet Volume  8.3 


 


Blood Urea Nitrogen  40 


 


Creatinine  2.15 


 


Random Glucose  76 


 


Calcium Level  8.0 


 


Magnesium Level  2.2 


 


Sodium Level  145 


 


Potassium Level  3.3 


 


Chloride Level  112 


 


Carbon Dioxide Level  25.0 


 


Anion Gap  8 


 


Estimat Glomerular Filtration


Rate 


  29 


 














 Date/Time


Source Procedure


Growth Status


 


 


 18 16:18


Nasal Aspirate Influenza Types A,B Antigen (BIANCA) - Final


Positive For Flu A Antigen Complete








 (Kylah Leslie)


Result Diagram:  


18 0748                                                                    

            18 0748





Imaging





Last 72 hours Impressions








Chest X-Ray 18 1536 Signed





Impressions: 





 Service Date/Time:  2018 15:46 - CONCLUSION:  Minimal 





 patchy densities right lower lobe could be infiltrate..     Sarwat Villanueav MD 








 (Kylah Leslie)





Assessment and Plan


Problem List:  


(1) Acute renal failure superimposed on stage 3 chronic kidney disease


ICD Codes:  N17.9 - Acute kidney failure, unspecified; N18.3 - Chronic kidney 

disease, stage 3 (moderate)


Plan:  Her renal function has been declining over the past 12-18 months


Most recent GFR was 43, creatinine 1.57. Suspected diabetic nephropathy with 

heavy proteinuria.


BRONSON may be due to infection, limited oral intake; may also have allergic 

interstitial nephritis due to recent antibiotic use. 


Tolerating oral fluids, reduce IVF to 50 cc/hr


Obtain renal US


UA with electrolytes has been ordered and is pending


Non oliguric, monitor urine output 


Quantify proteinuria.


Repeat labs in AM, avoid nephrotoxic agents. 





(2) Hypertension


ICD Codes:  I10 - Hypertension


Status:  Acute


Plan:  Needs better control


On nifedipine XL, increase to 90 mg daily


Losartan on hold, resume in AM 





(3) Hypokalemia


ICD Codes:  E87.6 - Hypokalemia


Status:  Acute


Plan:  Replacement has been given, repeat labs 





(4) Diabetes mellitus


ICD Codes:  E11.9 - Diabetes mellitus


Status:  Acute


Plan:  May be diet controlled, she states her metformin was stopped


Monitor and use low dose insulin if needed. 


Check A1c





(5) Flu


ICD Codes:  J11.1 - Influenza due to unidentified influenza virus with other 

respiratory manifestations


Plan:  With superimposed pneumonia


On Tamiflu, also on Rocephin and Zithromax


On droplet precautions, monitor clinically, continue supportive care 





(6) Anemia


ICD Codes:  D64.9 - Anemia, unspecified


Plan:  mild, Check iron profile tomorrow.


 (Kylah Leslie)


Assessment and Plan


patient was seen and examined. BRONSON could be pre-renal azotemia due to influenza 

and dehydration. Underlying proteinuric CKD, with some decline renal function. 

Will monitor. Quantify proteinuria. 


 (Moose Levi MD)











Kylah Leslie 2018 11:41


Moose Levi MD 2018 14:45

## 2018-02-02 VITALS
RESPIRATION RATE: 20 BRPM | TEMPERATURE: 97.7 F | OXYGEN SATURATION: 98 % | DIASTOLIC BLOOD PRESSURE: 99 MMHG | HEART RATE: 77 BPM | SYSTOLIC BLOOD PRESSURE: 174 MMHG

## 2018-02-02 VITALS
DIASTOLIC BLOOD PRESSURE: 92 MMHG | RESPIRATION RATE: 18 BRPM | OXYGEN SATURATION: 98 % | HEART RATE: 73 BPM | TEMPERATURE: 98.8 F | SYSTOLIC BLOOD PRESSURE: 154 MMHG

## 2018-02-02 VITALS — SYSTOLIC BLOOD PRESSURE: 161 MMHG | DIASTOLIC BLOOD PRESSURE: 83 MMHG

## 2018-02-02 VITALS — HEART RATE: 73 BPM

## 2018-02-02 VITALS — OXYGEN SATURATION: 98 % | HEART RATE: 71 BPM

## 2018-02-02 VITALS — DIASTOLIC BLOOD PRESSURE: 95 MMHG | SYSTOLIC BLOOD PRESSURE: 168 MMHG

## 2018-02-02 LAB
% SATURATION IRON PROFILE: 7.5 % (ref 20–50)
BUN SERPL-MCNC: 33 MG/DL (ref 7–18)
CALCIUM SERPL-MCNC: 8.5 MG/DL (ref 8.5–10.1)
CHLORIDE SERPL-SCNC: 111 MEQ/L (ref 98–107)
CREAT SERPL-MCNC: 2.01 MG/DL (ref 0.5–1)
ERYTHROCYTE [DISTWIDTH] IN BLOOD BY AUTOMATED COUNT: 16.2 % (ref 11.6–17.2)
GFR SERPLBLD BASED ON 1.73 SQ M-ARVRAT: 31 ML/MIN (ref 89–?)
GLUCOSE SERPL-MCNC: 121 MG/DL (ref 74–106)
HCO3 BLD-SCNC: 24.5 MEQ/L (ref 21–32)
HCT VFR BLD CALC: 30.9 % (ref 35–46)
HGB BLD-MCNC: 10.2 GM/DL (ref 11.6–15.3)
IRON (FE): 17 MCG/DL (ref 50–170)
MAGNESIUM SERPL-MCNC: 2.1 MG/DL (ref 1.5–2.5)
MCH RBC QN AUTO: 27.2 PG (ref 27–34)
MCHC RBC AUTO-ENTMCNC: 33 % (ref 32–36)
MCV RBC AUTO: 82.4 FL (ref 80–100)
PLATELET # BLD: 219 TH/MM3 (ref 150–450)
PMV BLD AUTO: 8.5 FL (ref 7–11)
RBC # BLD AUTO: 3.75 MIL/MM3 (ref 4–5.3)
SODIUM SERPL-SCNC: 143 MEQ/L (ref 136–145)
TIBC SERPL-MCNC: 225 MCG/DL (ref 250–450)
WBC # BLD AUTO: 7.2 TH/MM3 (ref 4–11)

## 2018-02-02 RX ADMIN — IPRATROPIUM BROMIDE AND ALBUTEROL SULFATE PRN AMPULE: .5; 3 SOLUTION RESPIRATORY (INHALATION) at 01:25

## 2018-02-02 RX ADMIN — INSULIN ASPART SCH: 100 INJECTION, SOLUTION INTRAVENOUS; SUBCUTANEOUS at 07:50

## 2018-02-02 RX ADMIN — Medication SCH ML: at 09:00

## 2018-02-02 RX ADMIN — OSELTAMIVIR PHOSPHATE SCH MG: 6 POWDER, FOR SUSPENSION ORAL at 09:12

## 2018-02-02 RX ADMIN — ACYCLOVIR SCH UNITS: 800 TABLET ORAL at 09:13

## 2018-02-02 RX ADMIN — ASPIRIN 81 MG SCH MG: 81 TABLET ORAL at 09:12

## 2018-02-02 RX ADMIN — HEPARIN SODIUM SCH UNITS: 10000 INJECTION, SOLUTION INTRAVENOUS; SUBCUTANEOUS at 06:26

## 2018-02-02 NOTE — HHI.NPPN
Subjective


General Problems:  Anemia


Renal Failure:  Chronic, Acute, Stage III


Interval History


Renal function is better. She is going to be discharged today. 


 (Kylah Leslie)





Review of Systems


Respiratory


Lungs:  SOB, Cough


 (Kylah Leslie)





Objective Data


Data











 2/2/18 2/3/18





 19:00 07:00


 


Intake Total 240 ml 


 


Balance 240 ml 


 


  


 


Intake Oral 240 ml 











Vital Signs








  Date Time  Temp Pulse Resp B/P (MAP) Pulse Ox O2 Delivery O2 Flow Rate FiO2


 


2/2/18 09:28      Room Air  


 


2/2/18 08:43 98.8 73 18 154/92 (112) 98   


 


2/2/18 08:00  71      


 


2/2/18 08:00     98   


 


2/2/18 06:00    168/95 (119)    


 


2/2/18 03:41  73      


 


2/2/18 01:30    161/83 (109)    


 


2/2/18 00:35      Room Air  


 


2/2/18 00:35 97.7 77 20 174/99 (124) 98   


 


2/1/18 23:48  72      


 


2/1/18 20:57      Room Air  


 


2/1/18 20:57 97.8 77 20 162/89 (113) 98   


 


2/1/18 20:52     96   


 


2/1/18 19:44  78      


 


2/1/18 16:00 97.6 79 20 171/97 (121) 96   


 


2/1/18 16:00  76      








 (Kylah Leslie)


-:  


2/2/18 0605                                                                    

            2/2/18 0605





Imaging





Last 72 hours Impressions








Renal Ultrasound 2/1/18 0000 Signed





Impressions: 





 Service Date/Time:  Thursday, February 1, 2018 12:43 - CONCLUSION:  1. 

Echogenic 





 kidneys consistent with medical renal disease. 2. No obstructive uropathy. 3. 





 Very subtle trace nonspecific perinephric fluid near the superior pole of the 





 right kidney.     Michael Ingram MD 


 


Chest X-Ray 1/30/18 1536 Signed





Impressions: 





 Service Date/Time:  Tuesday, January 30, 2018 15:46 - CONCLUSION:  Minimal 





 patchy densities right lower lobe could be infiltrate..     Sarwat Villanueva MD 








 (Kylah Leslie)





Physical Exam


General


Appearance:  Well Developed, No Acute Distress, Comfortable


 (Kylah Leslie. ARNP)





Throat


Throat Exam:  Oral Mucosa Pink & Moist


 (Kylah Leslie B. ARNP)





Neck


Neck Exam:  Neck Supple


 (Kylah Leslie B. ARNP)





Pulmonary


Resp Exam:  No Distress, Crackles


 (Kylah Leslie B. ARNP)





Cardiology


CV Exam:  Regular, Normal Sinus Rhythm


 (Kylah Leslie B. ARNP)





Gastrointestinal/Abdomen


GI Exam:  Soft, Non-Tender


 (Kylah Leslie B. ARNP)





Musculoskeletal


MS Exam:  Joints Intact, Normal Gait, Normal Tone


 (Kylah Leslie B. ARNP)





Integumentary


Skin Exam:  Clear, Warm, Dry, Intact


 (Kylah Leslie B. ARNP)





Extremeties


Extremities Exam:  No Edema, Pedal Pulses Palpable


 (Kylah Leslie B. ARNP)





Neurologic


Neuro Exam:  Alert, Awake, Oriented, Speech Clear, Moving All Extremities


 (Kylah Leslie. ARNP)





Assessment/Plan


Discussed Condition With:  Patient


Assessment Summary:  BORNSON/Acute Renal Failure, CKD Stage III


Problem List:  


(1) Acute renal failure superimposed on stage 3 chronic kidney disease


ICD Codes:  N17.9 - Acute kidney failure, unspecified; N18.3 - Chronic kidney 

disease, stage 3 (moderate)


Plan:  Her renal function has been declining over the past 12-18 months


Baseline CKD 3 with creatinine 1.57. Suspected diabetic nephropathy with heavy 

proteinuria. Over 6 grams this admission


BRONSON may have been due to prerenal azotemia secondary to limited oral intake and 

infection


Renal function is better. Off IVF.


K replaced.


Renal US negative


Excellent urine output


Advised to avoid NSAIDs at home. 





(2) Hypertension


ICD Codes:  I10 - Hypertension


Status:  Acute


Plan:  Needs better control


On nifedipine XL 90 mg daily


Losartan increased to 50 mg BID





(3) Hypokalemia


ICD Codes:  E87.6 - Hypokalemia


Status:  Acute


Plan:  Replacement has been given





(4) Diabetes mellitus


ICD Codes:  E11.9 - Diabetes mellitus


Status:  Acute


Plan:  Advised to hold Metformin for one week


On PO glipizide 





(5) Flu


ICD Codes:  J11.1 - Influenza due to unidentified influenza virus with other 

respiratory manifestations


Plan:  With superimposed pneumonia


On Tamiflu, to be discharged on PO Zithromax





(6) Anemia


ICD Codes:  D64.9 - Anemia, unspecified


Plan:  mild, has iron deficiency


start oral iron





Plan


We will follow with the patient in our CKD clinic in 3-4 weeks. 


 (Kylah Leslie)


Plan


patient was seen and examined. Renal function is better. Has heavy proteinuria. 

We will discuss renal biopsy with her. To be discharged today. 


 (Moose Levi MD)











Kylah Leslie Feb 2, 2018 12:09


Moose Levi MD Feb 2, 2018 14:47

## 2018-02-02 NOTE — HHI.DCPOC
Discharge Care Plan


Diagnosis:  


(1) Pneumonia


(2) Acute renal failure superimposed on stage 3 chronic kidney disease


(3) Hypertension


(4) Anemia


(5) Diabetes mellitus


(6) Flu


(7) Hypokalemia


Goals to Promote Your Health


* To prevent worsening of your condition and complications


* To maintain your health at the optimal level


Directions to Meet Your Goals


*** Take your medications as prescribed


*** Follow your dietary instruction


*** Follow activity as directed








*** Keep your appointments as scheduled


*** Take your immunizations and boosters as scheduled


*** If your symptoms worsen call your PCP, if no PCP go to Urgent Care Center 

or Emergency Room***


*** Smoking is Dangerous to Your Health. Avoid second hand smoke***


***Call the 24-hour hour crisis hotline for domestic abuse at 1-846.963.2072***











Juma De Jesus DO Feb 2, 2018 11:17

## 2018-02-02 NOTE — HHI.DS
cc:   JurgenJohnEber L MD


__________________________________________________





Discharge Summary


Admission Date


Jan 30, 2018 at 18:26


Discharge Date:  Feb 2, 2018


Admitting Diagnosis





Cough, Pneumonia, elevated troponin, influenza





(1) Pneumonia


ICD Code:  J18.9 - Pneumonia, unspecified organism


Diagnosis:  Principal





(2) Acute renal failure superimposed on stage 3 chronic kidney disease


ICD Code:  N17.9 - Acute kidney failure, unspecified; N18.3 - Chronic kidney 

disease, stage 3 (moderate)


Diagnosis:  Principal





(3) Hypertension


ICD Code:  I10 - Hypertension


Diagnosis:  Principal


Status:  Acute


(4) Anemia


ICD Code:  D64.9 - Anemia, unspecified


(5) Diabetes mellitus


ICD Code:  E11.9 - Diabetes mellitus


Status:  Acute


(6) Flu


ICD Code:  J11.1 - Influenza due to unidentified influenza virus with other 

respiratory manifestations


Diagnosis:  Principal





(7) Hypokalemia


ICD Code:  E87.6 - Hypokalemia


Status:  Acute


Procedures


None


Brief History - From Admission


53-year-old female with past medical history significant for COPD, diabetes 

mellitus, hypertension, hyperlipidemia and CK D presents to the emergency 

department with 3 days of progressively worsening shortness of breath.  Patient 

reports she had URI symptoms that began 3 days ago including congestion and a 

cough productive of yellow sputum.  She states she has been having a difficult 

time catching her breath, worse with activity.  She endorses fever/chills of 

one day duration.  Denies chest pain.  Patient is positive for flu and has a 

chest x-ray concerning for pneumonia.  Vital signs: Temperature 98.5, pulse 84, 

respirations 18, /99, pulse ox 98% on room air.


CBC/BMP:  


2/2/18 0605                                                                    

            2/2/18 0605





Significant Findings





Laboratory Tests








Test


  1/30/18


16:18 1/30/18


22:53 1/31/18


07:10 1/31/18


13:01


 


Red Blood Count


  3.87 MIL/MM3


(4.00-5.30) 


  3.71 MIL/MM3


(4.00-5.30) 


 


 


Hemoglobin


  10.8 GM/DL


(11.6-15.3) 


  10.1 GM/DL


(11.6-15.3) 


 


 


Hematocrit


  32.2 %


(35.0-46.0) 


  30.5 %


(35.0-46.0) 


 


 


Monocytes (%) (Auto)


  18.1 %


(0.0-8.0) 


  


  


 


 


Eosinophils (%) (Auto)


  6.3 %


(0.0-4.0) 


  


  


 


 


Monocytes # (Auto)


  1.2 TH/MM3


(0-0.9) 


  


  


 


 


Blood Urea Nitrogen


  51 MG/DL


(7-18) 


  43 MG/DL


(7-18) 


 


 


Creatinine


  2.25 MG/DL


(0.50-1.00) 


  1.93 MG/DL


(0.50-1.00) 


 


 


Calcium Level


  8.3 MG/DL


(8.5-10.1) 


  8.3 MG/DL


(8.5-10.1) 


 


 


Sodium Level


  148 MEQ/L


(136-145) 


  


  


 


 


Chloride Level


  112 MEQ/L


() 


  112 MEQ/L


() 


 


 


Estimat Glomerular Filtration


Rate 28 ML/MIN


(>89) 


  33 ML/MIN


(>89) 


 


 


Total Creatine Kinase


  202 U/L


() 211 U/L


() 274 U/L


() 302 U/L


()


 


Troponin I


  0.11 NG/ML


(0.02-0.05) 0.10 NG/ML


(0.02-0.05) 0.11 NG/ML


(0.02-0.05) 


 


 


Neutrophils (%) (Auto)


  


  


  81.0 %


(16.0-70.0) 


 


 


Lymphocytes # (Auto)


  


  


  0.7 TH/MM3


(1.0-4.8) 


 


 


Random Glucose


  


  


  183 MG/DL


() 


 


 


Test


  2/1/18


07:48 2/1/18


15:40 2/2/18


06:05 


 


 


Red Blood Count


  3.50 MIL/MM3


(4.00-5.30) 


  3.75 MIL/MM3


(4.00-5.30) 


 


 


Hemoglobin


  9.5 GM/DL


(11.6-15.3) 


  10.2 GM/DL


(11.6-15.3) 


 


 


Hematocrit


  28.8 %


(35.0-46.0) 


  30.9 %


(35.0-46.0) 


 


 


Blood Urea Nitrogen


  40 MG/DL


(7-18) 


  33 MG/DL


(7-18) 


 


 


Creatinine


  2.15 MG/DL


(0.50-1.00) 


  2.01 MG/DL


(0.50-1.00) 


 


 


Calcium Level


  8.0 MG/DL


(8.5-10.1) 


  


  


 


 


Potassium Level


  3.3 MEQ/L


(3.5-5.1) 


  3.4 MEQ/L


(3.5-5.1) 


 


 


Chloride Level


  112 MEQ/L


() 


  111 MEQ/L


() 


 


 


Estimat Glomerular Filtration


Rate 29 ML/MIN


(>89) 


  31 ML/MIN


(>89) 


 


 


Total Creatine Kinase


  326 U/L


() 


  348 U/L


() 


 


 


Urine Protein


  


  300 mg/dL


(NEG-TRACE) 


  


 


 


Urine Glucose (UA)


  


  300 mg/dL


(NEG) 


  


 


 


Urine Occult Blood  SMALL (NEG)   


 


Urine Microalbumin/Creatinine


Ratio 


  6237 MG/G CRE


(0-30) 


  


 


 


Random Glucose


  


  


  121 MG/DL


() 


 


 


Iron Level


  


  


  17 MCG/DL


() 


 


 


Total Iron Binding Capacity


  


  


  225 MCG/DL


(250-450) 


 


 


Percent Iron Saturation   7.5 % (20-50)  








Imaging





Last Impressions








Renal Ultrasound 2/1/18 0000 Signed





Impressions: 





 Service Date/Time:  Thursday, February 1, 2018 12:43 - CONCLUSION:  1. 

Echogenic 





 kidneys consistent with medical renal disease. 2. No obstructive uropathy. 3. 





 Very subtle trace nonspecific perinephric fluid near the superior pole of the 





 right kidney.     Michael Ingram MD 


 


Chest X-Ray 1/30/18 1536 Signed





Impressions: 





 Service Date/Time:  Tuesday, January 30, 2018 15:46 - CONCLUSION:  Minimal 





 patchy densities right lower lobe could be infiltrate..     Sarwat Villanueva MD 








PE at Discharge


GENERAL: Resting comfortably.


SKIN: No rashes, ecchymoses or lesions. Cool and dry.


HEAD: Atraumatic. Normocephalic. No temporal or scalp tenderness.


EYES: Pupils equal round and reactive. Extraocular motions intact. No scleral 

icterus. No injection or drainage. 


ENT: Nose without bleeding, purulent drainage or septal hematoma. Throat 

without erythema, tonsillar hypertrophy or exudate. Uvula midline. Airway 

patent.


NECK: Trachea midline. No JVD or lymphadenopathy. Supple, nontender, no 

meningeal signs.


CARDIOVASCULAR: Regular rate and rhythm without murmurs, gallops, or rubs. 


RESPIRATORY: Clear to auscultation. Breath sounds equal bilaterally. No wheezes

, rales, or rhonchi.  Poor air movement.


GASTROINTESTINAL: Abdomen soft, non-tender, nondistended. No hepato-splenomegaly

, or palpable masses. No guarding.


MUSCULOSKELETAL: Extremities without clubbing, cyanosis, or edema. No joint 

tenderness, effusion, or edema noted. 


NEUROLOGICAL: Awake and alert. Cranial nerves II through XII intact.  Motor and 

sensory grossly within normal limits. Normal speech.


PSYCH: Mood and affect appropriate.


Pt update on day of discharge


The pt wanted to go home. She said she will follow up with her kidney doctor. 

She said her breathing was much improved. Discussed with nursing.


Hospital Course


Influenza


Positive for flu A antigen. Shw will complete a course of Tamiflu, renally 

dosed.





Community acquired pneumonia


Chest x-ray showed minimal patchy densities in the right lower lobe, possible 

infiltrate. Also with flu as above. She received Rocephin/azithromycin IV and 

Duonebs. She received supplemental oxygen when necessary. She will complete a 

course of Ceftin and azithromycin, renally dosed. She will be provided with an 

albuterol inhaler.





Acute on chronic renal insufficiency


Creatinine 2.25 on admission. Patient reports a history of chronic kidney 

disease s/t DM. Nephrology was consulted. She received IVFs. FENa calculated at 

2.8%. Renal US with medical renal disease. UA negative for infection. Her 

potassium was repleted. Losartan dose was decreased. Metformin will be 

discontinued. She will follow up with her nephrologist as an outpt.





Elevated troponin


Troponin peaked at 0.11. Initial EKG showed NSR with T wave inversion in V5/V6, 

no ST segment elevations or depressions. Thought to be demand ischemia 

secondary to influenza/ PNA/ acute on chronic renal failure. She denied any 

chest pain. She received treatment as above. She did not wish to pursue further 

testing in the hospital with a stress test and would like to follow up with her 

cardiologist as an outpatient upon discharge. 





Diabetes mellitus


Glucose levels elevated as pt was started on steroids. Steroids were 

discontinued. She was placed on SSI and Levemir. She will resume her glipizide 

upon discharge. Metformin will be d/c in light of renal function.





Hypertension


Nifedipine increased by nephrology. She will resume losartan at a lower dose.


Pt Condition on Discharge:  Stable


Discharge Disposition:  Discharge Home


Discharge Time:  > 30 minutes


Discharge Instructions


DIET: Follow Instructions for:  Diabetic Diet


Activities you can perform:  Weight Bearing as Joi


Follow up Referrals:  


Cardiology - 1 Week with Eber Seaman MD


Nephrology - 1 Week with Dr. Juan Francisco Arita


PCP Follow-up - 1 Week





New Orders:  


BASIC METABOLIC PROF - 3-5 Days





New Medications:  


Albuterol 8.5 GM Inh (Proair Hfa 8.5 GM Inh) 90 Mcg/Act Aer


2 PUFF INH Q4-6H PRN for SHORTNESS OF BREATH, #1 INHALER 0 Refills


108 mcg/actuation


Azithromycin (Azithromycin) 500 Mg Tab


500 MG PO DAILY for Infection, #2 TAB 0 Refills





Cefuroxime (Cefuroxime) 500 Mg Tab


500 MG PO DAILY for Infection for 5 Days, #5 TAB 0 Refills





Losartan (Losartan) 50 Mg Tab


50 MG PO BID for Blood Pressure Management, #60 TAB 0 Refills





Oseltamivir (Tamiflu) 30 Mg Cap


30 MG PO BID for Mgmt Viral Infection for 2 Days, #4 CAP 0 Refills





Nifedipine (Nifedipine ER) 90 Mg Tab


90 MG PO DAILY for Blood Pressure Management, #30 TAB





 


Continued Medications:  


Ascorbic Acid (Vitamin C) 250 Mg Tab


500 MG PO DAILY for Nutritional Supplement, TAB 0 Refills





Aspirin (Aspirin) 81 Mg Chew


81 MG CHEW DAILY, TAB 0 Refills





Cholecalciferol (D3 Super Strength) 2,000 Unit Cap


1000 UNITS PO DAILY for Nutritional Supplement, #30 CAP 0 Refills





Fish Oil-Cholecalciferol (Omega-3 Fish Oil/Vitamin) 1,000-1,000 Mg Cap


1 CAP PO DAILY for Nutritional Supplement, CAP 0 Refills





Glipizide (Glipizide) 10 Mg Tab


10 MG PO BIDAC for Blood Sugar Management, #60 TAB 0 Refills


Take 30 minutes before a meal


Zolpidem (Zolpidem) 10 Mg Tab


10 MG PO HS PRN for INSOMNIA, TAB 0 Refills





 


Discontinued Medications:  


Losartan (Losartan) 100 Mg Tab


100 MG PO DAILY for Blood Pressure Management, #30 TAB 0 Refills





Metformin (Metformin) 1,000 Mg Tab


1000 MG PO DAILY for Blood Sugar Management, #30 TAB 0 Refills


With a meal


Nifedipine ER 24 HR (Nifedipine ER 24 HR) 60 Mg Tab


60 MG PO DAILY, #30 TAB 0 Refills

















Juma De Jesus DO Feb 2, 2018 11:41

## 2018-04-04 ENCOUNTER — HOSPITAL ENCOUNTER (OUTPATIENT)
Dept: HOSPITAL 17 - HRAD | Age: 53
Discharge: HOME | End: 2018-04-04
Attending: RADIOLOGY
Payer: MEDICARE

## 2018-04-04 VITALS
TEMPERATURE: 97.7 F | DIASTOLIC BLOOD PRESSURE: 85 MMHG | OXYGEN SATURATION: 93 % | RESPIRATION RATE: 20 BRPM | HEART RATE: 81 BPM | SYSTOLIC BLOOD PRESSURE: 144 MMHG

## 2018-04-04 VITALS
RESPIRATION RATE: 18 BRPM | OXYGEN SATURATION: 94 % | SYSTOLIC BLOOD PRESSURE: 129 MMHG | HEART RATE: 80 BPM | DIASTOLIC BLOOD PRESSURE: 64 MMHG

## 2018-04-04 VITALS
DIASTOLIC BLOOD PRESSURE: 75 MMHG | HEART RATE: 83 BPM | OXYGEN SATURATION: 93 % | SYSTOLIC BLOOD PRESSURE: 135 MMHG | RESPIRATION RATE: 18 BRPM

## 2018-04-04 VITALS
OXYGEN SATURATION: 93 % | HEART RATE: 72 BPM | TEMPERATURE: 97.8 F | RESPIRATION RATE: 18 BRPM | SYSTOLIC BLOOD PRESSURE: 157 MMHG | DIASTOLIC BLOOD PRESSURE: 80 MMHG

## 2018-04-04 VITALS
DIASTOLIC BLOOD PRESSURE: 65 MMHG | SYSTOLIC BLOOD PRESSURE: 130 MMHG | RESPIRATION RATE: 18 BRPM | OXYGEN SATURATION: 97 % | HEART RATE: 78 BPM

## 2018-04-04 VITALS
SYSTOLIC BLOOD PRESSURE: 134 MMHG | HEART RATE: 60 BPM | RESPIRATION RATE: 18 BRPM | DIASTOLIC BLOOD PRESSURE: 68 MMHG | OXYGEN SATURATION: 98 %

## 2018-04-04 VITALS — BODY MASS INDEX: 28.87 KG/M2 | WEIGHT: 173.28 LBS | HEIGHT: 65 IN

## 2018-04-04 VITALS
RESPIRATION RATE: 18 BRPM | HEART RATE: 64 BPM | SYSTOLIC BLOOD PRESSURE: 138 MMHG | OXYGEN SATURATION: 98 % | DIASTOLIC BLOOD PRESSURE: 55 MMHG

## 2018-04-04 VITALS
RESPIRATION RATE: 18 BRPM | OXYGEN SATURATION: 93 % | HEART RATE: 81 BPM | SYSTOLIC BLOOD PRESSURE: 121 MMHG | DIASTOLIC BLOOD PRESSURE: 75 MMHG

## 2018-04-04 VITALS
SYSTOLIC BLOOD PRESSURE: 126 MMHG | DIASTOLIC BLOOD PRESSURE: 64 MMHG | RESPIRATION RATE: 18 BRPM | OXYGEN SATURATION: 97 % | HEART RATE: 78 BPM

## 2018-04-04 DIAGNOSIS — I12.9: ICD-10-CM

## 2018-04-04 DIAGNOSIS — N18.3: ICD-10-CM

## 2018-04-04 DIAGNOSIS — E11.22: ICD-10-CM

## 2018-04-04 DIAGNOSIS — N17.9: Primary | ICD-10-CM

## 2018-04-04 LAB
BASOPHILS # BLD AUTO: 0.1 TH/MM3 (ref 0–0.2)
BASOPHILS # BLD AUTO: 0.1 TH/MM3 (ref 0–0.2)
BASOPHILS NFR BLD: 1.2 % (ref 0–2)
BASOPHILS NFR BLD: 1.3 % (ref 0–2)
EOSINOPHIL # BLD: 0.6 TH/MM3 (ref 0–0.4)
EOSINOPHIL # BLD: 0.6 TH/MM3 (ref 0–0.4)
EOSINOPHIL NFR BLD: 10.7 % (ref 0–4)
EOSINOPHIL NFR BLD: 10.7 % (ref 0–4)
ERYTHROCYTE [DISTWIDTH] IN BLOOD BY AUTOMATED COUNT: 15.9 % (ref 11.6–17.2)
ERYTHROCYTE [DISTWIDTH] IN BLOOD BY AUTOMATED COUNT: 16.6 % (ref 11.6–17.2)
HCT VFR BLD CALC: 30.1 % (ref 35–46)
HCT VFR BLD CALC: 32.4 % (ref 35–46)
HGB BLD-MCNC: 10.5 GM/DL (ref 11.6–15.3)
HGB BLD-MCNC: 9.9 GM/DL (ref 11.6–15.3)
LYMPHOCYTES # BLD AUTO: 1.6 TH/MM3 (ref 1–4.8)
LYMPHOCYTES # BLD AUTO: 1.7 TH/MM3 (ref 1–4.8)
LYMPHOCYTES NFR BLD AUTO: 29 % (ref 9–44)
LYMPHOCYTES NFR BLD AUTO: 31.6 % (ref 9–44)
MCH RBC QN AUTO: 26.5 PG (ref 27–34)
MCH RBC QN AUTO: 27 PG (ref 27–34)
MCHC RBC AUTO-ENTMCNC: 32.4 % (ref 32–36)
MCHC RBC AUTO-ENTMCNC: 32.7 % (ref 32–36)
MCV RBC AUTO: 81.8 FL (ref 80–100)
MCV RBC AUTO: 82.5 FL (ref 80–100)
MONOCYTE #: 0.5 TH/MM3 (ref 0–0.9)
MONOCYTE #: 0.5 TH/MM3 (ref 0–0.9)
MONOCYTES NFR BLD: 8.7 % (ref 0–8)
MONOCYTES NFR BLD: 9.3 % (ref 0–8)
NEUTROPHILS # BLD AUTO: 2.5 TH/MM3 (ref 1.8–7.7)
NEUTROPHILS # BLD AUTO: 2.7 TH/MM3 (ref 1.8–7.7)
NEUTROPHILS NFR BLD AUTO: 47.8 % (ref 16–70)
NEUTROPHILS NFR BLD AUTO: 49.7 % (ref 16–70)
PLATELET # BLD: 240 TH/MM3 (ref 150–450)
PLATELET # BLD: 256 TH/MM3 (ref 150–450)
PMV BLD AUTO: 8 FL (ref 7–11)
PMV BLD AUTO: 8 FL (ref 7–11)
RBC # BLD AUTO: 3.65 MIL/MM3 (ref 4–5.3)
RBC # BLD AUTO: 3.97 MIL/MM3 (ref 4–5.3)
WBC # BLD AUTO: 5.3 TH/MM3 (ref 4–11)
WBC # BLD AUTO: 5.5 TH/MM3 (ref 4–11)

## 2018-04-04 PROCEDURE — 85025 COMPLETE CBC W/AUTO DIFF WBC: CPT

## 2018-04-04 PROCEDURE — 88350 IMFLUOR EA ADDL 1ANTB STN PX: CPT

## 2018-04-04 PROCEDURE — 88313 SPECIAL STAINS GROUP 2: CPT

## 2018-04-04 PROCEDURE — 88305 TISSUE EXAM BY PATHOLOGIST: CPT

## 2018-04-04 PROCEDURE — 88342 IMHCHEM/IMCYTCHM 1ST ANTB: CPT

## 2018-04-04 PROCEDURE — 88346 IMFLUOR 1ST 1ANTB STAIN PX: CPT

## 2018-04-04 PROCEDURE — 50200 RENAL BIOPSY PERQ: CPT

## 2018-04-04 PROCEDURE — 88348 ELECTRON MICROSCOPY DX: CPT

## 2018-04-04 PROCEDURE — 77012 CT SCAN FOR NEEDLE BIOPSY: CPT

## 2018-04-04 NOTE — PD.RAD
Post CT Procedure Prog Note


Pre Procedure Diagnosis:  


(1) Acute renal failure superimposed on stage 3 chronic kidney disease


Post Procedure Diagnosis:  


(1) Acute renal failure superimposed on stage 3 chronic kidney disease


Procedure Date:


Apr 4, 2018


Supervising Radiologist:


Adan Luther


Anesthesia:  Local, Analgesia, Conscious Sedation


Plan of Activity


Patient to Unit:  [facility]


Patient Condition:  Good


See PACS Report for procedural detail/treatment





Biopsy


Imaging Guidance:  CT


Side:  Right


Biopsy Procedure:  Kidney (LP)


Specimen:  Core Biopsy (18 gauge)


Findings:


Coaxial bx.  Tract sealed with Thrombin and Gelfoam slurry











Adan Luther MD Apr 4, 2018 09:35

## 2018-04-04 NOTE — RADRPT
EXAM DATE/TIME:  04/04/2018 08:40 

 

[facility] COMPARISON:     

No previous studies available for comparison.

 

 

 

INDICATIONS :      

Chronic kidney disease.

                     

 

 

 

SEDATION TIME:       

30 minutes

 

 

BIOPSY SITE:       

Right 

                     

 

MEDICATION(S):

1.) 3.5 mg midazolam (Versed) IV  

2.) 175 mcg fentanyl (Sublimaze) IV  

 

 

DEVICE(S):

1.) 18 gauge coaxial Bard

                     

 

MEDICAL HISTORY :     

Diabetes mellitus type 2. Renal failure, chronic. Hypertension. 

 

SURGICAL HISTORY :     

None.   

 

ENCOUNTER:     

Initial

 

ACUITY:     

1 day

 

PAIN SCORE:     

0/10

 

LOCATION:     

Right 

                     

A total of one core specimen(s) were obtained and sent to the laboratory for pathologic evaluation.

 

 

PROCEDURE:

1.   CT guided renal biopsy.

2.   Conscious sedation with continuous EKG and oximetry monitoring.

3.   EKG and oximetry remained stable throughout the procedure.

 

Prior to the procedure informed consent was obtained.  Any appropriate prior imaging studies were rev
iewed. 

Using automated exposure control and adjustment of the mA and/or kV according to patient size, radiat
ion dose was kept as low as reasonably achievable to obtain optimal diagnostic quality images.  DICOM
 format image data is available electronically for review and comparison.   

 

 

The site was prepped in a sterile fashion.  Full sterile technique was used, including cap, mask, omari
rile gloves and gown and a large sterile sheet.  Hand hygiene and 2% chlorhexidine and/or betadine/al
cohol prep was utilized per protocol for cutaneous antisepsis.  The skin and subcutaneous tissues wer
e infiltrated with local anesthetic solution.

 

With CT guidance the previously identified target was localized. Biopsy was performed using the presc
ribed needle as above.  Adequate hemostasis was obtained with prominent Gelfoam slurry through the ca
rrier needle and compression at the puncture site.

 

Follow-up CT scan reveals minimal perinephric hemorrhage..

 

The patient tolerated the procedure well and there were no complications. The patient was returned to
 the Radiology Outpatient Unit in stable condition.

 

CONCLUSION:     Uncomplicated CT guided biopsy.

 

 

 

 Adan Luther MD on April 04, 2018 at 12:04           

Board Certified Radiologist.

 This report was verified electronically.

## 2018-05-07 ENCOUNTER — HOSPITAL ENCOUNTER (INPATIENT)
Dept: HOSPITAL 17 - NEPC | Age: 53
LOS: 4 days | Discharge: HOME | DRG: 65 | End: 2018-05-11
Attending: HOSPITALIST | Admitting: HOSPITALIST
Payer: MEDICARE

## 2018-05-07 VITALS
TEMPERATURE: 98.9 F | HEART RATE: 79 BPM | RESPIRATION RATE: 16 BRPM | SYSTOLIC BLOOD PRESSURE: 184 MMHG | OXYGEN SATURATION: 97 % | DIASTOLIC BLOOD PRESSURE: 97 MMHG

## 2018-05-07 VITALS — HEIGHT: 66 IN | BODY MASS INDEX: 31.89 KG/M2 | WEIGHT: 198.42 LBS

## 2018-05-07 VITALS
SYSTOLIC BLOOD PRESSURE: 183 MMHG | HEART RATE: 81 BPM | DIASTOLIC BLOOD PRESSURE: 97 MMHG | RESPIRATION RATE: 18 BRPM | OXYGEN SATURATION: 100 %

## 2018-05-07 VITALS — OXYGEN SATURATION: 96 %

## 2018-05-07 DIAGNOSIS — D63.1: ICD-10-CM

## 2018-05-07 DIAGNOSIS — I63.9: Primary | ICD-10-CM

## 2018-05-07 DIAGNOSIS — M17.12: ICD-10-CM

## 2018-05-07 DIAGNOSIS — K21.9: ICD-10-CM

## 2018-05-07 DIAGNOSIS — E11.319: ICD-10-CM

## 2018-05-07 DIAGNOSIS — N17.9: ICD-10-CM

## 2018-05-07 DIAGNOSIS — R47.81: ICD-10-CM

## 2018-05-07 DIAGNOSIS — J44.9: ICD-10-CM

## 2018-05-07 DIAGNOSIS — R47.1: ICD-10-CM

## 2018-05-07 DIAGNOSIS — E78.00: ICD-10-CM

## 2018-05-07 DIAGNOSIS — I08.1: ICD-10-CM

## 2018-05-07 DIAGNOSIS — I12.9: ICD-10-CM

## 2018-05-07 DIAGNOSIS — E11.21: ICD-10-CM

## 2018-05-07 DIAGNOSIS — Z86.73: ICD-10-CM

## 2018-05-07 DIAGNOSIS — Z88.1: ICD-10-CM

## 2018-05-07 DIAGNOSIS — E11.22: ICD-10-CM

## 2018-05-07 DIAGNOSIS — Q21.1: ICD-10-CM

## 2018-05-07 DIAGNOSIS — I65.29: ICD-10-CM

## 2018-05-07 DIAGNOSIS — Z79.84: ICD-10-CM

## 2018-05-07 DIAGNOSIS — N18.4: ICD-10-CM

## 2018-05-07 DIAGNOSIS — Z79.82: ICD-10-CM

## 2018-05-07 DIAGNOSIS — R29.701: ICD-10-CM

## 2018-05-07 LAB
BACTERIA #/AREA URNS HPF: (no result) /HPF
BASOPHILS # BLD AUTO: 0 TH/MM3 (ref 0–0.2)
BASOPHILS NFR BLD: 0.8 % (ref 0–2)
COLOR UR: (no result)
EOSINOPHIL # BLD: 0.5 TH/MM3 (ref 0–0.4)
EOSINOPHIL NFR BLD: 8.4 % (ref 0–4)
ERYTHROCYTE [DISTWIDTH] IN BLOOD BY AUTOMATED COUNT: 15.9 % (ref 11.6–17.2)
FIBRINOGEN PPP-MCNC: 432 MG/DL (ref 227–377)
GLUCOSE UR STRIP-MCNC: (no result) MG/DL
HCT VFR BLD CALC: 30.1 % (ref 35–46)
HGB BLD-MCNC: 9.8 GM/DL (ref 11.6–15.3)
HGB UR QL STRIP: (no result)
INR PPP: 1 RATIO
KETONES UR STRIP-MCNC: (no result) MG/DL
LYMPHOCYTES # BLD AUTO: 1.9 TH/MM3 (ref 1–4.8)
LYMPHOCYTES NFR BLD AUTO: 33.2 % (ref 9–44)
MCH RBC QN AUTO: 26.7 PG (ref 27–34)
MCHC RBC AUTO-ENTMCNC: 32.5 % (ref 32–36)
MCV RBC AUTO: 82.3 FL (ref 80–100)
MONOCYTE #: 0.6 TH/MM3 (ref 0–0.9)
MONOCYTES NFR BLD: 10.9 % (ref 0–8)
MUCOUS THREADS #/AREA URNS LPF: (no result) /LPF
NEUTROPHILS # BLD AUTO: 2.7 TH/MM3 (ref 1.8–7.7)
NEUTROPHILS NFR BLD AUTO: 46.7 % (ref 16–70)
NITRITE UR QL STRIP: (no result)
PLATELET # BLD: 246 TH/MM3 (ref 150–450)
PMV BLD AUTO: 8 FL (ref 7–11)
PROTHROMBIN TIME: 9.8 SEC (ref 9.8–11.6)
RBC # BLD AUTO: 3.66 MIL/MM3 (ref 4–5.3)
SP GR UR STRIP: 1.01 (ref 1–1.03)
SQUAMOUS #/AREA URNS HPF: 3 /HPF (ref 0–5)
TRANS CELLS #/AREA URNS HPF: <1 /HPF
TROPONIN I SERPL-MCNC: 0.1 NG/ML (ref 0.02–0.05)
URINE LEUKOCYTE ESTERASE: (no result)
WBC # BLD AUTO: 5.9 TH/MM3 (ref 4–11)

## 2018-05-07 PROCEDURE — 84300 ASSAY OF URINE SODIUM: CPT

## 2018-05-07 PROCEDURE — 85610 PROTHROMBIN TIME: CPT

## 2018-05-07 PROCEDURE — 82948 REAGENT STRIP/BLOOD GLUCOSE: CPT

## 2018-05-07 PROCEDURE — 82570 ASSAY OF URINE CREATININE: CPT

## 2018-05-07 PROCEDURE — 85384 FIBRINOGEN ACTIVITY: CPT

## 2018-05-07 PROCEDURE — 70496 CT ANGIOGRAPHY HEAD: CPT

## 2018-05-07 PROCEDURE — 80053 COMPREHEN METABOLIC PANEL: CPT

## 2018-05-07 PROCEDURE — 85730 THROMBOPLASTIN TIME PARTIAL: CPT

## 2018-05-07 PROCEDURE — 80307 DRUG TEST PRSMV CHEM ANLYZR: CPT

## 2018-05-07 PROCEDURE — 83036 HEMOGLOBIN GLYCOSYLATED A1C: CPT

## 2018-05-07 PROCEDURE — 80061 LIPID PANEL: CPT

## 2018-05-07 PROCEDURE — 70551 MRI BRAIN STEM W/O DYE: CPT

## 2018-05-07 PROCEDURE — 86850 RBC ANTIBODY SCREEN: CPT

## 2018-05-07 PROCEDURE — 86922 COMPATIBILITY TEST ANTIGLOB: CPT

## 2018-05-07 PROCEDURE — 76775 US EXAM ABDO BACK WALL LIM: CPT

## 2018-05-07 PROCEDURE — 93005 ELECTROCARDIOGRAM TRACING: CPT

## 2018-05-07 PROCEDURE — 80048 BASIC METABOLIC PNL TOTAL CA: CPT

## 2018-05-07 PROCEDURE — 93306 TTE W/DOPPLER COMPLETE: CPT

## 2018-05-07 PROCEDURE — 82552 ASSAY OF CPK IN BLOOD: CPT

## 2018-05-07 PROCEDURE — 84100 ASSAY OF PHOSPHORUS: CPT

## 2018-05-07 PROCEDURE — 96360 HYDRATION IV INFUSION INIT: CPT

## 2018-05-07 PROCEDURE — 94640 AIRWAY INHALATION TREATMENT: CPT

## 2018-05-07 PROCEDURE — 94664 DEMO&/EVAL PT USE INHALER: CPT

## 2018-05-07 PROCEDURE — 81001 URINALYSIS AUTO W/SCOPE: CPT

## 2018-05-07 PROCEDURE — 84484 ASSAY OF TROPONIN QUANT: CPT

## 2018-05-07 PROCEDURE — 74176 CT ABD & PELVIS W/O CONTRAST: CPT

## 2018-05-07 PROCEDURE — 70450 CT HEAD/BRAIN W/O DYE: CPT

## 2018-05-07 PROCEDURE — 85025 COMPLETE CBC W/AUTO DIFF WBC: CPT

## 2018-05-07 PROCEDURE — 82550 ASSAY OF CK (CPK): CPT

## 2018-05-07 PROCEDURE — 86901 BLOOD TYPING SEROLOGIC RH(D): CPT

## 2018-05-07 PROCEDURE — 86900 BLOOD TYPING SEROLOGIC ABO: CPT

## 2018-05-07 PROCEDURE — 70498 CT ANGIOGRAPHY NECK: CPT

## 2018-05-07 PROCEDURE — 86920 COMPATIBILITY TEST SPIN: CPT

## 2018-05-07 PROCEDURE — 80069 RENAL FUNCTION PANEL: CPT

## 2018-05-07 NOTE — PD
HPI


Chief Complaint:  Neuro Symptoms/ Deficits


Time Seen by Provider:  22:40


Travel History


International Travel<30 days:  No


Contact w/Intl Traveler<30days:  No


Traveled to known affect area:  No





History of Present Illness


HPI


Patient 53-year-old female presents emergency department with "20 minutes" 

history of difficulty speaking and difficulty finding words.  The patient's 

daughter she came home from work just after 11 and found her mom having 

difficulty speaking and given her history of strokes decided to bring her in to 

be seen.  She is not currently on any blood thinners.  This coming by another 

family member patient's son who states that when discussing this with the 

patient's daughter it apparently started about 20 minutes prior to daughter 

arriving home.  Patient's son states this actually started about 10:00pm which 

is actually closer to 40 minutes ago.  When asked when last seen normal he 

cannot definitively tell me when he thinks it was about 10 PM.  Patient 

pleasantly confused denies any pain denies any focalized weakness.  She is able 

to answer simple yes/no questions and just has some minor speech deficits.  

When asked to say "you cannot teach an old dog new tricks" she responds by 

saying "you cannot speak to an old dog".





PFSH


Past Medical History


Hx Anticoagulant Therapy:  Yes (81MG ASPIRIN )


Anemia:  Yes


Arthritis:  Yes (LEFT KNEE)


Asthma:  No


Blood Disorders:  No


Anxiety:  No


Depression:  No


Heart Rhythm Problems:  No


Cancer:  No


Cardiac Catheterization:  Yes (DENIES BUT PREVIOUSLY LISTED AS YES)


Cardiovascular Problems:  No


High Cholesterol:  Yes


Chemotherapy:  No


Chest Pain:  No


Congestive Heart Failure:  No


COPD:  Yes


Cerebrovascular Accident:  Yes


Diabetes:  Yes


Patient Takes Glucophage:  Yes


Diminished Hearing:  No


Endocrine:  Yes


Gastrointestinal Disorders:  Yes (GASTROPARESIS)


GERD:  Yes


Glaucoma:  No


Gout:  Yes


Genitourinary:  Yes


Hepatitis:  No


Hiatal Hernia:  No


Hypertension:  Yes


Immune Disorder:  No


Implanted Vascular Access Dvce:  No


Kidney Stones:  No


Musculoskeletal:  No


Neurologic:  Yes (CVA)


Psychiatric:  No


Reproductive:  No


Respiratory:  Yes (COPD)


Immunizations Current:  Yes


Migraines:  No


Myocardial Infarction:  No


Pneumonia:  Yes


Radiation Therapy:  No


Renal Failure:  No


Seizures:  No


Sickle Cell Disease:  No


Sleep Apnea:  No


Thyroid Disease:  No


Ulcer:  No


Tetanus Vaccination:  Unknown


Influenza Vaccination:  Yes


Pregnant?:  Not Pregnant


Menopausal:  Yes


:  5


Para:  7


Miscarriage:  0


:  0


Tubal Ligation:  Yes





Past Surgical History


Abdominal Surgery:  No


Appendectomy:  No


Arteriovenous Shunt:  No


Cardiac Surgery:  No


 Section:  Yes (4)


Ear Surgery:  No


Endocrine Surgery:  No


Eye Surgery:  No


Genitourinary Surgery:  No


Gynecologic Surgery:  Yes (4 CSECTIONS)


Hysterectomy:  No


Oral Surgery:  No


Pacemaker:  No


Thoracic Surgery:  No


Other Surgery:  Yes (BREAST SURGERY LEFT BREAST ABCESS 2X )





Social History


Alcohol Use:  No


Tobacco Use:  No


Substance Use:  No





Allergies-Medications


(Allergen,Severity, Reaction):  


Coded Allergies:  


     levofloxacin (Verified  Allergy, Severe, ITCHING, 18)


 OCCURRED THIS FIRST DOSE; ONLY RECEIVED 1/2


Reported Meds & Prescriptions





Reported Meds & Active Scripts


Active


Proair Hfa 8.5 GM Inh (Albuterol Sulfate) 90 Mcg/Act Aer 2 Puff INH Q4-6H PRN


     108 mcg/actuation


Losartan (Losartan Potassium) 50 Mg Tab 50 Mg PO BID


Nifedipine ER (Nifedipine) 90 Mg Tab 90 Mg PO DAILY


Reported


Atorvastatin (Atorvastatin Calcium) 80 Mg Tab 40 Mg PO HS


Tradjenta (Linagliptin) 5 Mg Tab 5 Mg PO DAILY


Glipizide 10 Mg Tab 10 Mg PO BIDAC


     Take 30 minutes before a meal


Zolpidem (Zolpidem Tartrate) 10 Mg Tab 10 Mg PO HS PRN


Vitamin C (Ascorbic Acid) 250 Mg Tab 500 Mg PO DAILY


Aspirin 81 Mg Chew 81 Mg CHEW DAILY


D3 Super Strength (Cholecalciferol) 2,000 Unit Cap 1,000 Units PO DAILY


Omega-3 Fish Oil/Vitamin (Fish Oil-Cholecalciferol) 1,000-1,000 Mg Cap 1 Cap PO 

DAILY








Review of Systems


Except as stated in HPI:  all other systems reviewed are Neg





Physical Exam


Narrative


GENERAL: Well-developed well-nourished appears older than stated age in no 

obvious distress


SKIN: Focused skin assessment warm/dry.


HEAD: Atraumatic. Normocephalic. 


EYES: Pupils equal and round. No scleral icterus. No injection or drainage. 


ENT: No nasal bleeding or discharge.  Mucous membranes pink and moist.


NECK: Trachea midline. No JVD. 


CARDIOVASCULAR: Regular rate and rhythm.  No murmur appreciated.


RESPIRATORY: No accessory muscle use. Clear to auscultation. Breath sounds 

equal bilaterally. 


GASTROINTESTINAL: Abdomen soft, non-tender, nondistended. Hepatic and splenic 

margins not palpable. 


MUSCULOSKELETAL: No obvious deformities. No clubbing.  No cyanosis.  No edema. 


NEUROLOGICAL: Awake and alert and oriented, cranial nerves II through XII are 

grossly intact and nonfocal, 5 out of 5 strength in all 4 extremities, no arm 

drift, no pronator drift, total in a stroke scale is 1-2 for dysarthria only.  

Speech had normal as listed in the HPI.


PSYCHIATRIC: Appropriate mood and affect; insight and judgment normal.





Data


Data


Last Documented VS





Vital Signs








  Date Time  Temp Pulse Resp B/P (MAP) Pulse Ox O2 Delivery O2 Flow Rate FiO2


 


18 22:51   20  99 Room Air  


 


18 22:51  81  183/97 (125)    


 


18 22:43        21


 


18 22:33 98.9       








Orders





 Orders


Activity Bed Rest (18 )


Electrocardiogram (18 )


I-Stat Profile (18 22:42)


Prothrombin Time / Inr (Pt) (18 22:42)


Act Partial Throm Time (Ptt) (18 22:42)


Complete Blood Count With Diff (18 22:42)


Fibrinogen (18 22:42)


Creatine Kinase (Cpk) (18 22:42)


Troponin I (18 22:42)


Ua Includes Microscopic (18 22:42)


Drug Screen, Random Urine (18 22:42)


Type And Screen (18 22:42)


Ct Brain W/O Iv Contrast(Rout) (18 )


Cta Brain W Iv Contrast W 3d (18 22:42)


Cta Neck W Iv Contrast W 3d (18 22:42)


Consult Neurology (18 )


Blood Glucose (18 22:42)


Ecg Monitoring (18 22:42)


Neuro Checks Q2HX12,Q4H (18 22:42)


Nursing Bedside Swallow Assess .ONCE (18 22:42)


Iv Access Insert/Monitor (18 22:42)


NPO (18 22:42)


Oximetry (18 22:42)


Resp Oxygen Nc Stroke (18 )


Sodium Chlor 0.9% 1000 Ml Inj (Ns 1000 M (18 22:42)


Cath For Specimen (18 22:42)


(Hub Use Only)Inp Phy Cons/Ref (18 )


Aspirin Chew (Aspirin Chew) (18 23:15)


Red Blood Cells (Rbc) (18 22:45)


Iodixanol 320 Inj (Rad Ct) (Visipaque 32 (18 23:23)


CKMB (18 22:45)


CKMB% (18 22:45)


Admit Order (Ed Use Only) (18 )





Labs





Laboratory Tests








Test


  18


22:45 18


23:17


 


White Blood Count 5.9 TH/MM3  


 


Red Blood Count 3.66 MIL/MM3  


 


Hemoglobin 9.8 GM/DL  


 


Bedside Hemoglobin 9.5 G/DL  


 


Hematocrit 30.1 %  


 


Bedside Hematocrit 28.0 %  


 


Mean Corpuscular Volume 82.3 FL  


 


Mean Corpuscular Hemoglobin 26.7 PG  


 


Mean Corpuscular Hemoglobin


Concent 32.5 % 


  


 


 


Red Cell Distribution Width 15.9 %  


 


Platelet Count 246 TH/MM3  


 


Mean Platelet Volume 8.0 FL  


 


Neutrophils (%) (Auto) 46.7 %  


 


Lymphocytes (%) (Auto) 33.2 %  


 


Monocytes (%) (Auto) 10.9 %  


 


Eosinophils (%) (Auto) 8.4 %  


 


Basophils (%) (Auto) 0.8 %  


 


Neutrophils # (Auto) 2.7 TH/MM3  


 


Lymphocytes # (Auto) 1.9 TH/MM3  


 


Monocytes # (Auto) 0.6 TH/MM3  


 


Eosinophils # (Auto) 0.5 TH/MM3  


 


Basophils # (Auto) 0.0 TH/MM3  


 


CBC Comment DIFF FINAL  


 


Differential Comment   


 


Prothrombin Time 9.8 SEC  


 


Prothromb Time International


Ratio 1.0 RATIO 


  


 


 


Activated Partial


Thromboplast Time 23.8 SEC 


  


 


 


Fibrinogen 432 mg/dL  


 


Bedside Sodium 142 MMOL/L  


 


Bedside Potassium 3.9 MMOL/L  


 


Bedside Chloride 111 MMOL/L  


 


Bedside Blood Urea Nitrogen 76 MG/DL  


 


Bedside Creatinine 4.4 MG/DL  


 


Bedside Glucose 126 MG/DL  


 


Total Creatine Kinase 265 U/L  


 


Creatine Kinase MB 3.1 NG/ML  


 


Creatine Kinase MB % 1.2 %  


 


Troponin I 0.10 NG/ML  


 


Urine Color  LIGHT-YELLOW 


 


Urine Turbidity  CLEAR 


 


Urine pH  6.0 


 


Urine Specific Gravity  1.011 


 


Urine Protein  300 mg/dL 


 


Urine Glucose (UA)  NEG mg/dL 


 


Urine Ketones  NEG mg/dL 


 


Urine Occult Blood  SMALL 


 


Urine Nitrite  NEG 


 


Urine Bilirubin  NEG 


 


Urine Urobilinogen


  


  LESS THAN 2.0


MG/DL


 


Urine Leukocyte Esterase  NEG 


 


Urine RBC


  


  LESS THAN 1


/hpf


 


Urine WBC  1 /hpf 


 


Urine Squamous Epithelial


Cells 


  2 /hpf 


 


 


Urine Transitional Epithelial


Cells 


  <1 /hpf 


 


 


Urine Bacteria  RARE /hpf 


 


Urine Mucus  FEW /lpf 


 


Microscopic Urinalysis Comment


  


  CULT NOT


INDICATED


 


Urine Random Creatinine  43.8 MG/DL 


 


Urine Random Sodium  94 MEQ/L 


 


Urine Opiates Screen  NEG 


 


Urine Barbiturates Screen  NEG 


 


Urine Amphetamines Screen  NEG 


 


Urine Benzodiazepines Screen  NEG 


 


Urine Cocaine Screen  NEG 


 


Urine Cannabinoids Screen  NEG 











MDM


Medical Screen Exam Complete:  Yes


Emergency Medical Condition:  Yes


Differential Diagnosis


Acute CVA, subacute CVA, acute hemorrhage, hyperglycemia, electrolyte 

abnormality.


Narrative Course


Patient room to the emergency department, after discussing with the family I 

have my doubts on the timeline, still son fairly insistent that started just 

about 10 PM, she was therefore made a stroke alert, with her NIH stroke scale 

of 1-2 the patient was discussed with Dr. Rodriguez and we agree that the patient 

risks probably outweigh the benefits for TPA at this time.  She was given a 

full dose aspirin, mildly hypertensive on arrival, progressive hypertensive for 

now.





Patient was taken to CAT scan which was negative, a CTA was ordered which was 

performed by protocol prior to ice as being performed.  This was a poor study 

secondary to patient cooperation, patient's creatinine returns to greater than 4

, I have instructed the patient to return to the examination room as I believe 

an additional dose of contrast is not warranted at this time.





Patient symptoms are remaining somewhat constant while in the emergency 

department.  I did have a discussion with the family regarding TPA and the my 

recommendation is for withholding the medication the family states they would 

not want it given the risk of bleeding.





Permissive hypertension.





DIscussed with Dr. Sanches for admission.


Stroke Alert  NIHSS


NIH Stroke Scale Result:  1


NIHSS Time Completed:  10:40





Diagnosis


Diagnosis:  


 Primary Impression:  


 CVA (cerebral infarction)


Admitting Physician Requests:  Admit


Condition:  Stable











Hipolito Chacon MD May 7, 2018 23:38

## 2018-05-07 NOTE — RADRPT
EXAM DATE/TIME:  05/07/2018 22:47 

 

[facility] COMPARISON:     

No previous studies available for comparison.

 

 

INDICATIONS :     

Stroke Alert, Dysthria.

                      

 

RADIATION DOSE:     

56.35 CTDIvol (mGy) 

 

 

 

MEDICAL HISTORY :     

Non-responsive.  

 

SURGICAL HISTORY :      

Non-responsive. 

 

ENCOUNTER:      

Initial

 

ACUITY:      

1 day

 

PAIN SCALE:      

Non-responsive

 

LOCATION:        

cranial 

 

TECHNIQUE:     

Multiple contiguous axial images were obtained of the head.  Using automated exposure control and adj
ustment of the mA and/or kV according to patient size, radiation dose was kept as low as reasonably a
chievable to obtain optimal diagnostic quality images.   DICOM format image data is available electro
nically for review and comparison.  

 

FINDINGS:     

 

CEREBRUM:     

There areas of encephalomalacia involving the external capsule and basal ganglia on the left. Old str
shea in the right cerebellar hemisphere and a large left previous cerebral stroke. The brain stem lacu
bud infarct. I don't see any evidence of acute hemorrhage or mass effect..  No extra-axial fluid zane
ections are seen.

 

POSTERIOR FOSSA:     

2 old left cerebellar strokes.  The 4th ventricle is midline.  The cerebellopontine angle is unremark
able.

 

EXTRACRANIAL:     

The visualized portion of the orbits is intact.

 

SKULL:     

The calvaria is intact.  No evidence of skull fracture.

 

CONCLUSION:     

Multiple old strokes including the cerebellar hemispheres. No evidence of hemorrhage or edema.

 

This report was called to Dr. Chacon at <2259 hrs.>.  

 

 

 

 Delvis Lozada MD on May 07, 2018 at 22:58           

Board Certified Radiologist.

 This report was verified electronically.

## 2018-05-07 NOTE — RADRPT
EXAM DATE/TIME:  05/07/2018 22:49 

 

[facility] COMPARISON:     

No previous studies available for comparison.

 

 

INDICATIONS :     

Stroke Alert, dysarthria. 

                      

 

IV CONTRAST:     

50 cc Visipaque (iodixanol) IV ; Cumulative dose for multiple exams.

 

 

RADIATION DOSE:     

10.31 CTDIvol (mGy) ; Combined studies

 

 

MEDICAL HISTORY :     

Non-responsive.  

 

SURGICAL HISTORY :      

Non-responsive. 

 

ENCOUNTER:      

Initial

 

ACUITY:      

1 day

 

PAIN SCALE:      

Non-responsive

 

LOCATION:        

neck 

Elevated flow velocities and ICA/CCA ratios have been found to correlate with increased degrees of 

vessel stenosis, calculated as percentage of diameter relative to a normal segment of distal ICA/CCA.


 

TECHNIQUE:     

Volumetric scanning was performed using a multirow detector CT scanner.  The data was post processed 
with a variety of visualization algorithms including full-volume maximum intensity projection, multip
lanar sliding thin-slab reformation, curved-planar reformation, and surface-rendering techniques.  Us
ing automated exposure control and adjustment of the mA and/or kV according to patient size, radiatio
n dose was kept as low as reasonably achievable to obtain optimal diagnostic quality images.  DICOM f
ormat image data is available electronically for review and comparison.  

 

FINDINGS:     

 

AORTIC ARCH:     

There is a three-vessel origin of the great vessels from the aorta.  No evidence of ostial narrowing.
 

 

RIGHT CAROTID:     

The common carotid artery is intact. The carotid bulb has a normal configuration without ulceration o
r narrowing. The internal carotid artery lumen is smooth without significant stenosis, there is a mur
al thrombosis.  The external carotid artery is intact.

 

LEFT CAROTID:     

The common carotid artery is intact.  The carotid bulb has a normal configuration without ulceration 
or narrowing.  The internal carotid artery lumen is smooth without stenosis, there is a mild eccentri
c stenosis.  The external carotid artery is intact.

 

VERTEBRALS:     

The right vertebral artery is dominant.  No stenotic lesions are seen. 

 

CONCLUSION:     

Mild atherosclerotic disease bilaterally in the internal carotid bulb. No two-dimensional stenosis is
 noted.

 

 

 

 Delvis Lozada MD on May 07, 2018 at 23:15           

Board Certified Radiologist.

 This report was verified electronically.

## 2018-05-07 NOTE — PD
HPI


Chief Complaint:  Neuro Symptoms/ Deficits


Time Seen by Provider:  22:40


Travel History


International Travel<30 days:  No


Contact w/Intl Traveler<30days:  No


Traveled to known affect area:  No





History of Present Illness


HPI


Patient 53-year-old female presents emergency department for evaluation of 

slurred speech and difficulty finding words starting at about 10 PM tonight.  

Patient has a history of multiple strokes in the past.





PFSH


Past Medical History


Hx Anticoagulant Therapy:  Yes (81MG ASPIRIN )


Anemia:  Yes


Arthritis:  Yes (LEFT KNEE)


Asthma:  No


Blood Disorders:  No


Anxiety:  No


Depression:  No


Heart Rhythm Problems:  No


Cancer:  No


Cardiac Catheterization:  Yes (DENIES BUT PREVIOUSLY LISTED AS YES)


Cardiovascular Problems:  No


High Cholesterol:  Yes


Chemotherapy:  No


Chest Pain:  No


Congestive Heart Failure:  No


COPD:  Yes


Cerebrovascular Accident:  Yes


Diabetes:  Yes


Diminished Hearing:  No


Endocrine:  Yes


GERD:  Yes


Glaucoma:  No


Gout:  Yes


Genitourinary:  Yes


Hepatitis:  No


Hiatal Hernia:  No


Hypertension:  Yes


Immune Disorder:  No


Implanted Vascular Access Dvce:  No


Kidney Stones:  No


Musculoskeletal:  No


Neurologic:  Yes (CVA)


Psychiatric:  No


Reproductive:  No


Respiratory:  Yes (COPD)


Immunizations Current:  Yes


Migraines:  No


Myocardial Infarction:  No


Pneumonia:  Yes


Radiation Therapy:  No


Renal Failure:  No


Seizures:  No


Sickle Cell Disease:  No


Sleep Apnea:  No


Thyroid Disease:  No


Ulcer:  No


Menopausal:  Yes


:  5


Para:  7


Miscarriage:  0


:  0


Tubal Ligation:  Yes





Past Surgical History


Abdominal Surgery:  No


Appendectomy:  No


Arteriovenous Shunt:  No


Cardiac Surgery:  No


 Section:  Yes (4)


Ear Surgery:  No


Endocrine Surgery:  No


Eye Surgery:  No


Genitourinary Surgery:  No


Gynecologic Surgery:  Yes (4 CSECTIONS)


Hysterectomy:  No


Neurologic Surgery:  Yes (CVA)


Oral Surgery:  No


Pacemaker:  No


Thoracic Surgery:  No


Other Surgery:  Yes (BREAST SURGERY LEFT BREAST ABCESS 2X )





Social History


Alcohol Use:  No


Tobacco Use:  Yes


Substance Use:  No





Allergies-Medications


(Allergen,Severity, Reaction):  


Coded Allergies:  


     levofloxacin (Verified  Allergy, Severe, ITCHING, 18)


 OCCURRED THIS FIRST DOSE; ONLY RECEIVED 1/2


Reported Meds & Prescriptions





Reported Meds & Active Scripts


Active


Proair Hfa 8.5 GM Inh (Albuterol Sulfate) 90 Mcg/Act Aer 2 Puff INH Q4-6H PRN


     108 mcg/actuation


Losartan (Losartan Potassium) 50 Mg Tab 50 Mg PO BID


Nifedipine ER (Nifedipine) 90 Mg Tab 90 Mg PO DAILY


Reported


Atorvastatin (Atorvastatin Calcium) 80 Mg Tab 40 Mg PO HS


Tradjenta (Linagliptin) 5 Mg Tab 5 Mg PO DAILY


Glipizide 10 Mg Tab 10 Mg PO BIDAC


     Take 30 minutes before a meal


Zolpidem (Zolpidem Tartrate) 10 Mg Tab 10 Mg PO HS PRN


Vitamin C (Ascorbic Acid) 250 Mg Tab 500 Mg PO DAILY


Aspirin 81 Mg Chew 81 Mg CHEW DAILY


D3 Super Strength (Cholecalciferol) 2,000 Unit Cap 1,000 Units PO DAILY


Omega-3 Fish Oil/Vitamin (Fish Oil-Cholecalciferol) 1,000-1,000 Mg Cap 1 Cap PO 

DAILY








Data


Data


Last Documented VS





Vital Signs








  Date Time  Temp Pulse Resp B/P (MAP) Pulse Ox O2 Delivery O2 Flow Rate FiO2


 


18 22:51   20  99 Room Air  


 


18 22:51  81  183/97 (125)    


 


18 22:43        21


 


18 22:33 98.9       








Orders





 Orders


Activity Bed Rest (18 )


Electrocardiogram (18 )


I-Stat Profile (18 22:42)


Prothrombin Time / Inr (Pt) (18 22:42)


Act Partial Throm Time (Ptt) (18 22:42)


Complete Blood Count With Diff (18 22:42)


Fibrinogen (18 22:42)


Creatine Kinase (Cpk) (18 22:42)


Troponin I (18 22:42)


Ua Includes Microscopic (18 22:42)


Drug Screen, Random Urine (18 22:42)


Type And Screen (18 22:42)


Ct Brain W/O Iv Contrast(Rout) (18 )


Cta Brain W Iv Contrast W 3d (18 22:42)


Cta Neck W Iv Contrast W 3d (18 22:42)


Consult Neurology (18 )


Blood Glucose (18 22:42)


Ecg Monitoring (18 22:42)


Neuro Checks Q2HX12,Q4H (18 22:42)


Nursing Bedside Swallow Assess .ONCE (18 22:42)


Iv Access Insert/Monitor (18 22:42)


NPO (18 22:42)


Oximetry (18 22:42)


Resp Oxygen Nc Stroke (18 )


Sodium Chlor 0.9% 1000 Ml Inj (Ns 1000 M (18 22:42)


Cath For Specimen (18 22:42)


(Hub Use Only)Inp Phy Cons/Ref (18 )


Aspirin Chew (Aspirin Chew) (18 23:15)


Red Blood Cells (Rbc) (18 22:45)


Iodixanol 320 Inj (Rad Ct) (Visipaque 32 (18 23:23)


CKMB (18 22:45)


CKMB% (18 22:45)


Admit Order (Ed Use Only) (18 )





Labs





Laboratory Tests








Test


  18


22:45 18


23:17


 


White Blood Count 5.9 TH/MM3  


 


Red Blood Count 3.66 MIL/MM3  


 


Hemoglobin 9.8 GM/DL  


 


Bedside Hemoglobin 9.5 G/DL  


 


Hematocrit 30.1 %  


 


Bedside Hematocrit 28.0 %  


 


Mean Corpuscular Volume 82.3 FL  


 


Mean Corpuscular Hemoglobin 26.7 PG  


 


Mean Corpuscular Hemoglobin


Concent 32.5 % 


  


 


 


Red Cell Distribution Width 15.9 %  


 


Platelet Count 246 TH/MM3  


 


Mean Platelet Volume 8.0 FL  


 


Neutrophils (%) (Auto) 46.7 %  


 


Lymphocytes (%) (Auto) 33.2 %  


 


Monocytes (%) (Auto) 10.9 %  


 


Eosinophils (%) (Auto) 8.4 %  


 


Basophils (%) (Auto) 0.8 %  


 


Neutrophils # (Auto) 2.7 TH/MM3  


 


Lymphocytes # (Auto) 1.9 TH/MM3  


 


Monocytes # (Auto) 0.6 TH/MM3  


 


Eosinophils # (Auto) 0.5 TH/MM3  


 


Basophils # (Auto) 0.0 TH/MM3  


 


CBC Comment DIFF FINAL  


 


Differential Comment   


 


Prothrombin Time 9.8 SEC  


 


Prothromb Time International


Ratio 1.0 RATIO 


  


 


 


Activated Partial


Thromboplast Time 23.8 SEC 


  


 


 


Fibrinogen 432 mg/dL  


 


Bedside Sodium 142 MMOL/L  


 


Bedside Potassium 3.9 MMOL/L  


 


Bedside Chloride 111 MMOL/L  


 


Bedside Blood Urea Nitrogen 76 MG/DL  


 


Bedside Creatinine 4.4 MG/DL  


 


Bedside Glucose 126 MG/DL  


 


Total Creatine Kinase 265 U/L  


 


Creatine Kinase MB 3.1 NG/ML  


 


Creatine Kinase MB % 1.2 %  


 


Troponin I 0.10 NG/ML  


 


Urine Color  LIGHT-YELLOW 


 


Urine Turbidity  CLEAR 


 


Urine pH  6.0 


 


Urine Specific Gravity  1.011 


 


Urine Protein  300 mg/dL 


 


Urine Glucose (UA)  NEG mg/dL 


 


Urine Ketones  NEG mg/dL 


 


Urine Occult Blood  SMALL 


 


Urine Nitrite  NEG 


 


Urine Bilirubin  NEG 


 


Urine Urobilinogen


  


  LESS THAN 2.0


MG/DL


 


Urine Leukocyte Esterase  NEG 


 


Urine RBC


  


  LESS THAN 1


/hpf


 


Urine WBC  1 /hpf 


 


Urine Squamous Epithelial


Cells 


  2 /hpf 


 


 


Urine Transitional Epithelial


Cells 


  <1 /hpf 


 


 


Urine Bacteria  RARE /hpf 


 


Urine Mucus  FEW /lpf 


 


Microscopic Urinalysis Comment


  


  CULT NOT


INDICATED


 


Urine Random Creatinine  43.8 MG/DL 


 


Urine Random Sodium  94 MEQ/L 


 


Urine Opiates Screen  NEG 


 


Urine Barbiturates Screen  NEG 


 


Urine Amphetamines Screen  NEG 


 


Urine Benzodiazepines Screen  NEG 


 


Urine Cocaine Screen  NEG 


 


Urine Cannabinoids Screen  NEG 











MDM


Medical Screen Exam Complete:  Yes


Emergency Medical Condition:  Yes


Stroke Alert  NIHSS


NIH Stroke Scale Result:  1


NIHSS Time Completed:  22:40











Hipolito Chacon MD May 7, 2018 23:57

## 2018-05-07 NOTE — RADRPT
EXAM DATE/TIME:  05/07/2018 22:49 

 

[facility] COMPARISON:     

No previous studies available for comparison.

 

 

INDICATIONS :     

Stroke Alert, Dysarthria.

                      

 

IV CONTRAST:     

50 cc Visipaque (iodixanol) IV ; Cumulative dose for multiple exams.

                      

 

RADIATION DOSE:     

10.31 CTDIvol (mGy) ; Combined studies

 

 

MEDICAL HISTORY :     

Non-responsive.  

 

SURGICAL HISTORY :      

Non-responsive. 

 

ENCOUNTER:      

Initial

 

ACUITY:      

1 day

 

PAIN SCALE:      

Non-responsive

 

LOCATION:        

neck 

 

TECHNIQUE:     

Volumetric scanning was performed using a multi-row detector CT scanner.  The data was post processed
 with a variety of visualization algorithms including full volume maximum intensity projection, multi
-planar sliding thin slab reformation, curved planar reformation, and surface rendering techniques.  
Using automated exposure control and adjustment of the mA and/or kV according to patient size, radiat
ion dose was kept as low as reasonably achievable to obtain optimal diagnostic quality images.   DICO
M format image data is available electronically for review and comparison.  

 

FINDINGS:     

There is poor visualization of the major intracranial arteries out to the second-order branch vessels
.  There is no evidence for aneurysm, vessel truncation or stenosis, and no evidence for vascular mal
formation.

The patient is right vertebral dominance.

Lower poor visualization of the second-order branches after the origin of the cerebral vessels.

 

CONCLUSION:     

Limit exam possibly related to motion. I don't see any definite filling defects or obvious stenoses b
ut it is a limited exam.

 

 

 

 Delvis Lozada MD on May 07, 2018 at 23:14           

Board Certified Radiologist.

 This report was verified electronically.

## 2018-05-08 VITALS
HEART RATE: 73 BPM | SYSTOLIC BLOOD PRESSURE: 176 MMHG | RESPIRATION RATE: 20 BRPM | OXYGEN SATURATION: 99 % | TEMPERATURE: 98 F | DIASTOLIC BLOOD PRESSURE: 90 MMHG

## 2018-05-08 VITALS
OXYGEN SATURATION: 94 % | TEMPERATURE: 97.3 F | DIASTOLIC BLOOD PRESSURE: 94 MMHG | SYSTOLIC BLOOD PRESSURE: 175 MMHG | HEART RATE: 77 BPM | RESPIRATION RATE: 20 BRPM

## 2018-05-08 VITALS
SYSTOLIC BLOOD PRESSURE: 183 MMHG | HEART RATE: 74 BPM | OXYGEN SATURATION: 98 % | DIASTOLIC BLOOD PRESSURE: 103 MMHG | RESPIRATION RATE: 24 BRPM

## 2018-05-08 VITALS
HEART RATE: 77 BPM | RESPIRATION RATE: 22 BRPM | DIASTOLIC BLOOD PRESSURE: 114 MMHG | SYSTOLIC BLOOD PRESSURE: 204 MMHG | OXYGEN SATURATION: 100 %

## 2018-05-08 VITALS
OXYGEN SATURATION: 99 % | DIASTOLIC BLOOD PRESSURE: 103 MMHG | HEART RATE: 73 BPM | TEMPERATURE: 97.5 F | RESPIRATION RATE: 17 BRPM | SYSTOLIC BLOOD PRESSURE: 194 MMHG

## 2018-05-08 VITALS
DIASTOLIC BLOOD PRESSURE: 92 MMHG | RESPIRATION RATE: 18 BRPM | OXYGEN SATURATION: 96 % | SYSTOLIC BLOOD PRESSURE: 146 MMHG | HEART RATE: 75 BPM

## 2018-05-08 VITALS — SYSTOLIC BLOOD PRESSURE: 175 MMHG | DIASTOLIC BLOOD PRESSURE: 100 MMHG

## 2018-05-08 VITALS
RESPIRATION RATE: 18 BRPM | TEMPERATURE: 98.2 F | OXYGEN SATURATION: 100 % | HEART RATE: 75 BPM | SYSTOLIC BLOOD PRESSURE: 137 MMHG | DIASTOLIC BLOOD PRESSURE: 101 MMHG

## 2018-05-08 VITALS
RESPIRATION RATE: 18 BRPM | HEART RATE: 84 BPM | DIASTOLIC BLOOD PRESSURE: 97 MMHG | SYSTOLIC BLOOD PRESSURE: 185 MMHG | OXYGEN SATURATION: 100 %

## 2018-05-08 VITALS — HEART RATE: 74 BPM

## 2018-05-08 LAB
ALBUMIN SERPL-MCNC: 2.8 GM/DL (ref 3.4–5)
BUN SERPL-MCNC: 66 MG/DL (ref 7–18)
CALCIUM SERPL-MCNC: 8.6 MG/DL (ref 8.5–10.1)
CHLORIDE SERPL-SCNC: 114 MEQ/L (ref 98–107)
COLOR UR: (no result)
CREAT SERPL-MCNC: 3.83 MG/DL (ref 0.5–1)
GFR SERPLBLD BASED ON 1.73 SQ M-ARVRAT: 15 ML/MIN (ref 89–?)
GLUCOSE SERPL-MCNC: 109 MG/DL (ref 74–106)
GLUCOSE UR STRIP-MCNC: (no result) MG/DL
HBA1C MFR BLD: 5.8 % (ref 4.3–6)
HCO3 BLD-SCNC: 21.6 MEQ/L (ref 21–32)
HGB UR QL STRIP: (no result)
KETONES UR STRIP-MCNC: (no result) MG/DL
NITRITE UR QL STRIP: (no result)
PHOSPHATE SERPL-MCNC: 4.8 MG/DL (ref 2.5–4.9)
SODIUM SERPL-SCNC: 146 MEQ/L (ref 136–145)
SODIUM,RANDOM URINE: 94 MEQ/L
SP GR UR STRIP: 1.01 (ref 1–1.03)
SQUAMOUS #/AREA URNS HPF: 2 /HPF (ref 0–5)
URINE LEUKOCYTE ESTERASE: (no result)

## 2018-05-08 RX ADMIN — PHENYTOIN SODIUM SCH MLS/HR: 50 INJECTION INTRAMUSCULAR; INTRAVENOUS at 00:35

## 2018-05-08 RX ADMIN — ATORVASTATIN CALCIUM SCH MG: 80 TABLET, FILM COATED ORAL at 21:13

## 2018-05-08 RX ADMIN — PHENYTOIN SODIUM SCH MLS/HR: 50 INJECTION INTRAMUSCULAR; INTRAVENOUS at 10:07

## 2018-05-08 RX ADMIN — Medication SCH ML: at 21:00

## 2018-05-08 RX ADMIN — INSULIN ASPART SCH: 100 INJECTION, SOLUTION INTRAVENOUS; SUBCUTANEOUS at 07:49

## 2018-05-08 RX ADMIN — ASPIRIN 81 MG SCH MG: 81 TABLET ORAL at 10:41

## 2018-05-08 RX ADMIN — CLOPIDOGREL BISULFATE SCH MG: 75 TABLET, FILM COATED ORAL at 17:23

## 2018-05-08 RX ADMIN — HEPARIN SODIUM SCH UNITS: 10000 INJECTION, SOLUTION INTRAVENOUS; SUBCUTANEOUS at 21:14

## 2018-05-08 RX ADMIN — Medication SCH ML: at 09:00

## 2018-05-08 RX ADMIN — PHENYTOIN SODIUM SCH MLS/HR: 50 INJECTION INTRAMUSCULAR; INTRAVENOUS at 17:49

## 2018-05-08 RX ADMIN — INSULIN ASPART SCH: 100 INJECTION, SOLUTION INTRAVENOUS; SUBCUTANEOUS at 21:00

## 2018-05-08 RX ADMIN — INSULIN ASPART SCH: 100 INJECTION, SOLUTION INTRAVENOUS; SUBCUTANEOUS at 12:00

## 2018-05-08 RX ADMIN — HEPARIN SODIUM SCH UNITS: 10000 INJECTION, SOLUTION INTRAVENOUS; SUBCUTANEOUS at 10:41

## 2018-05-08 RX ADMIN — INSULIN ASPART SCH: 100 INJECTION, SOLUTION INTRAVENOUS; SUBCUTANEOUS at 17:00

## 2018-05-08 RX ADMIN — NIFEDIPINE SCH MG: 90 TABLET, FILM COATED, EXTENDED RELEASE ORAL at 13:17

## 2018-05-08 NOTE — RADRPT
EXAM DATE/TIME:  2018 10:25 

 

[facility] COMPARISON:     

US KIDNEY/RENAL/BLADDER, May 08, 2018, 7:41.

 

 

INDICATIONS :     

Hydronephrosis seen on renal ultrasound. 

                  

 

ORAL CONTRAST:      

No oral contrast ingested.

                  

 

RADIATION DOSE:     

11.99 CTDIvol (mGy) 

 

 

MEDICAL HISTORY :     

Cardiovascular disease. Hypertension. Chronic kidney disease

 

SURGICAL HISTORY :      

 section. 

 

ENCOUNTER:      

Initial

 

ACUITY:      

1 day

 

PAIN SCALE:      

0/10

 

LOCATION:        

anterior 

 

TECHNIQUE:     

Volumetric scanning of the abdomen and pelvis was performed.  Using automated exposure control and ad
justment of the mA and/or kV according to patient size, radiation dose was kept as low as reasonably 
achievable to obtain optimal diagnostic quality images.  DICOM format image data is available electro
nically for review and comparison.  

 

FINDINGS:     

 

LOWER LUNGS:     

Parenchymal cyst in the left lingula/lung base measuring upwards at 3.1 cm in diameter. Minimal pleur
al parenchymal scarring or atelectasis in the medial right base and left lingula. No confluent infilt
rate

 

LIVER:     

Homogeneous density without lesion.  There is no dilation of the biliary tree.  No calcified gallston
es. There does appear to be some vicarious excretion of contrast into the gallbladder, however.

 

SPLEEN:     

Normal size without lesion.

 

PANCREAS:     

Within normal limits. 

 

KIDNEYS:     

Normal in size and shape. Contrast is identified in the collecting systems probably from the previous
 CTA of the brain/carotids. While there is some asymmetric prominence of the left renal pelvis, I do 
not see rukhsana hydronephrosis. Visualized portions of both ureters are normal in caliber without obvio
us stone disease.

 

ADRENAL GLANDS:     

Within normal limits.

 

VASCULAR:     

There is no aortic aneurysm.

 

BOWEL/MESENTERY:     

The stomach, small bowel, and colon demonstrate no acute abnormality.  There is no free intraperitone
al air or fluid. 

 

ABDOMINAL WALL:     

Within normal limits.

 

RETROPERITONEUM:     

There is no lymphadenopathy.

 

BLADDER:     

No wall thickening or mass.

 

REPRODUCTIVE:     

Uterus is enlarged, heterogeneous with multiple mass lesions, some of which are partially calcified. 
Uterus measures approximately 9.7 cm in diameter. Probable 2.1 cm left ovarian cyst.

 

INGUINAL:     

There is no lymphadenopathy or hernia.

 

MUSCULOSKELETAL:     

Asymmetric sclerosis in the left SI joint may represent some degree of sacroiliitis.

 

CONCLUSION:     

1. There is some asymmetric prominence of the left renal pelvis but I do not see rukhsana hydronephrosis
. Contrast is seen in both renal collecting systems from the prior contrasted CTA of the carotids and
 intracranial vessels.

2. Enlarged, fibroid uterus.

3. Vicarious excretion of contrast in the gallbladder lumen.

4. Parenchymal cyst in the left lingula/lung base with some pleural parenchymal scarring or atelectas
is in the medial right base and left lingula.

5. Possible small, 2.1 cm left ovarian cyst.

 

 

 

 Adan Luther MD on May 08, 2018 at 10:40           

Board Certified Radiologist.

 This report was verified electronically.

## 2018-05-08 NOTE — EKG
Date Performed: 05/07/2018       Time Performed: 23:08:47

 

PTAGE:      53 years

 

EKG:      Sinus rhythm 

 

 WITH OCCASIONAL VENTRICULAR PREMATURE COMPLEXES POSSIBLE LEFT ATRIAL ENLARGEMENT BORDERLINE RIGHT AX
IS DEVIATION MODERATE T-WAVE ABNORMALITY, CONSIDER LATERAL ISCHEMIA ABNORMAL ECG 

 

NO PREVIOUS TRACING            

 

DOCTOR:   Delvis Kurtz  Interpretating Date/Time  05/08/2018 11:00:19

## 2018-05-08 NOTE — HHI.HP
__________________________________________________





[facility]


Service


[facility]


Primary Care Physician


Barry James M.D.


Admission Diagnosis





Stroke, BRONSON


Diagnoses:  


Travel History


International Travel<30 Days:  No


Contact w/Intl Traveler <30 Da:  No


Traveled to Known Affected Are:  No


History of Present Illness


53-year-old female who presents brought in by family due to slurred speech 

starting possibly around 10 PM.  She says she does not know why she was brought 

into the hospital.  She is somnolent, wakes her exam, is actually oriented 3.  

She denies any changes in medications.  Denies any pain.  Denies any chest pain 

or shortness of breath.  She says she is tired I would like to sleep.





Review of Systems


Except as stated in HPI:  all other systems reviewed are Neg





Past Family Social History


Past Medical History





COPD


Diabetes mellitus


Hypertension


Hyperlipidemia


CKD


Past Surgical History





Reported Medications





Reported Meds & Active Scripts


Active


Proair Hfa 8.5 GM Inh (Albuterol Sulfate) 90 Mcg/Act Aer 2 Puff INH Q4-6H PRN


     108 mcg/actuation


Losartan (Losartan Potassium) 50 Mg Tab 50 Mg PO BID


Nifedipine ER (Nifedipine) 90 Mg Tab 90 Mg PO DAILY


Reported


Atorvastatin (Atorvastatin Calcium) 80 Mg Tab 80 Mg PO HS


Tradjenta (Linagliptin) 5 Mg Tab 5 Mg PO DAILY


Glipizide 10 Mg Tab 10 Mg PO BIDAC


     Take 30 minutes before a meal


Zolpidem (Zolpidem Tartrate) 10 Mg Tab 10 Mg PO HS PRN


Vitamin C (Ascorbic Acid) 250 Mg Tab 500 Mg PO DAILY


Aspirin 81 Mg Chew 81 Mg CHEW DAILY


D3 Super Strength (Cholecalciferol) 2,000 Unit Cap 1,000 Units PO DAILY


Omega-3 Fish Oil/Vitamin (Fish Oil-Cholecalciferol) 1,000-1,000 Mg Cap 1 Cap PO 

DAILY


Allergies:  


Coded Allergies:  


     levofloxacin (Verified  Allergy, Severe, ITCHING, 18)


 OCCURRED THIS FIRST DOSE; ONLY RECEIVED 1/2


Family History





Mother with CAD and diabetes


Social History


Patient quit smoking in 2017.  Nondrinker.  Denies illicit drugs.





Physical Exam


Vital Signs





Vital Signs








  Date Time  Temp Pulse Resp B/P (MAP) Pulse Ox O2 Delivery O2 Flow Rate FiO2


 


18 00:31  84 18 185/97 (126) 100 Room Air  


 


18 22:51   20  99 Room Air  


 


18 22:51  81 18 183/97 (125) 100 Room Air  


 


18 22:51     99 Room Air  


 


18 22:43     96   


 


18 22:43     96   21


 


18 22:33 98.9 79 16 184/97 (126) 97   








Physical Exam


GENERAL: This is a well-nourished, well-developed patient, in no apparent 

distress.  Somnolent, wakes for exam.  Oriented 3.


SKIN: No rashes, ecchymoses or lesions. Cool and dry.


HEAD: Atraumatic. Normocephalic. No temporal or scalp tenderness.


EYES: Pupils equal round and reactive. Extraocular motions intact. No scleral 

icterus. No injection or drainage. 


ENT: Nose without bleeding, purulent drainage or septal hematoma. Throat 

without erythema, tonsillar hypertrophy or exudate. Uvula midline. Airway 

patent.


NECK: Trachea midline. No JVD or lymphadenopathy. Supple, nontender, no 

meningeal signs.


CARDIOVASCULAR: Regular rate and rhythm without murmurs, gallops, or rubs. 


RESPIRATORY: Clear to auscultation. Breath sounds equal bilaterally. No wheezes

, rales, or rhonchi.  


GASTROINTESTINAL: Abdomen soft, non-tender, nondistended. No hepato-splenomegaly

, or palpable masses. No guarding.


MUSCULOSKELETAL: Extremities without clubbing, cyanosis, or edema. No joint 

tenderness, effusion, or edema noted. No calf tenderness. Negative Homans sign 

bilaterally.


NEUROLOGICAL: Awake and alert. Cranial nerves II through XII intact.  Motor and 

sensory grossly within normal limits. Five out of 5 muscle strength in all 

muscle groups.  Normal speech.


Laboratory





Laboratory Tests








Test


  18


22:45 18


23:17


 


White Blood Count 5.9  


 


Red Blood Count 3.66  


 


Hemoglobin 9.8  


 


Bedside Hemoglobin 9.5  


 


Hematocrit 30.1  


 


Bedside Hematocrit 28.0  


 


Mean Corpuscular Volume 82.3  


 


Mean Corpuscular Hemoglobin 26.7  


 


Mean Corpuscular Hemoglobin


Concent 32.5 


  


 


 


Red Cell Distribution Width 15.9  


 


Platelet Count 246  


 


Mean Platelet Volume 8.0  


 


Neutrophils (%) (Auto) 46.7  


 


Lymphocytes (%) (Auto) 33.2  


 


Monocytes (%) (Auto) 10.9  


 


Eosinophils (%) (Auto) 8.4  


 


Basophils (%) (Auto) 0.8  


 


Neutrophils # (Auto) 2.7  


 


Lymphocytes # (Auto) 1.9  


 


Monocytes # (Auto) 0.6  


 


Eosinophils # (Auto) 0.5  


 


Basophils # (Auto) 0.0  


 


CBC Comment DIFF FINAL  


 


Differential Comment   


 


Prothrombin Time 9.8  


 


Prothromb Time International


Ratio 1.0 


  


 


 


Activated Partial


Thromboplast Time 23.8 


  


 


 


Fibrinogen 432  


 


Bedside Sodium 142  


 


Bedside Potassium 3.9  


 


Bedside Chloride 111  


 


Bedside Blood Urea Nitrogen 76  


 


Bedside Creatinine 4.4  


 


Bedside Glucose 126  


 


Total Creatine Kinase 265  


 


Creatine Kinase MB 3.1  


 


Creatine Kinase MB % 1.2  


 


Troponin I 0.10  


 


Urine Color  LIGHT-YELLOW 


 


Urine Turbidity  CLEAR 


 


Urine pH  6.0 


 


Urine Specific Gravity  1.011 


 


Urine Protein  300 


 


Urine Glucose (UA)  NEG 


 


Urine Ketones  NEG 


 


Urine Occult Blood  SMALL 


 


Urine Nitrite  NEG 


 


Urine Bilirubin  NEG 


 


Urine Urobilinogen  LESS THAN 2.0 


 


Urine Leukocyte Esterase  NEG 


 


Urine RBC  LESS THAN 1 


 


Urine WBC  1 


 


Urine Squamous Epithelial


Cells 


  2 


 


 


Urine Transitional Epithelial


Cells 


  <1 


 


 


Urine Bacteria  RARE 


 


Urine Mucus  FEW 


 


Microscopic Urinalysis Comment


  


  CULT NOT


INDICATED


 


Urine Random Creatinine  43.8 


 


Urine Random Sodium  94 


 


Urine Opiates Screen  NEG 


 


Urine Barbiturates Screen  NEG 


 


Urine Amphetamines Screen  NEG 


 


Urine Benzodiazepines Screen  NEG 


 


Urine Cocaine Screen  NEG 


 


Urine Cannabinoids Screen  NEG 








Result Diagram:  


18





Imaging





Last Impressions








Neck CTA 18 Signed





Impressions: 





 Service Date/Time:  Monday, May 7, 2018 22:49 - CONCLUSION:  Mild 





 atherosclerotic disease bilaterally in the internal carotid bulb. No 





 two-dimensional stenosis is noted.     Delvis Lozada MD 


 


Head CTA 18 Signed





Impressions: 





 Service Date/Time:  Monday, May 7, 2018 22:49 - CONCLUSION:  Limit exam 

possibly 





 related to motion. I don't see any definite filling defects or obvious 

stenoses 





 but it is a limited exam.     Delvis Lozada MD 


 


Head CT 18 0000 Signed





Impressions: 





 Service Date/Time:  Monday, May 7, 2018 22:47 - CONCLUSION:  Multiple old 





 strokes including the cerebellar hemispheres. No evidence of hemorrhage or 





 edema.  This report was called to Dr. Chacon at <2259 hrs.>.       Stephen A. Sevigny, MD Caprini VTE Risk Assessment


Caprini VTE Risk Assessment:  No/Low Risk (score <= 1)


Caprini Risk Assessment Model











 Point Value = 1          Point Value = 2  Point Value = 3  Point Value = 5


 


Age 41-60


Minor surgery


BMI > 25 kg/m2


Swollen legs


Varicose veins


Pregnancy or postpartum


History of unexplained or recurrent


   spontaneous 


Oral contraceptives or hormone


   replacement


Sepsis (< 1 month)


Serious lung disease, including


   pneumonia (< 1 month)


Abnormal pulmonary function


Acute myocardial infarction


Congestive heart failure (< 1 month)


History of inflammatory bowel disease


Medical patient at bed rest Age 61-74


Arthroscopic surgery


Major open surgery (> 45 min)


Laparoscopic surgery (> 45 min)


Malignancy


Confined to bed (> 72 hours)


Immobilizing plaster cast


Central venous access Age >= 75


History of VTE


Family history of VTE


Factor V Leiden


Prothrombin 98531C


Lupus anticoagulant


Anticardiolipin antibodies


Elevated serum homocysteine


Heparin-induced thrombocytopenia


Other congenital or acquired


   thrombophilia Stroke (< 1 month)


Elective arthroplasty


Hip, pelvis, or leg fracture


Acute spinal cord injury (< 1 month)








Prophylaxis Regimen











   Total Risk


Factor Score Risk Level Prophylaxis Regimen


 


0-1      Low Early ambulation


 


2 Moderate Order ONE of the following:


*Sequential Compression Device (SCD)


*Heparin 5000 units SQ BID


 


3-4 Higher Order ONE of the following medications:


*Heparin 5000 units SQ TID


*Enoxaparin/Lovenox 40 mg SQ daily (WT < 150 kg, CrCl > 30 mL/min)


*Enoxaparin/Lovenox 30 mg SQ daily (WT < 150 kg, CrCl > 10-29 mL/min)


*Enoxaparin/Lovenox 30 mg SQ BID (WT < 150 kg, CrCl > 30 mL/min)


AND/OR


*Sequential Compression Device (SCD)


 


5 or more Highest Order ONE of the following medications:


*Heparin 5000 units SQ TID (Preferred with Epidurals)


*Enoxaparin/Lovenox 40 mg SQ daily (WT < 150 kg, CrCl > 30 mL/min)


*Enoxaparin/Lovenox 30 mg SQ daily (WT < 150 kg, CrCl > 10-29 mL/min)


*Enoxaparin/Lovenox 30 mg SQ BID (WT < 150 kg, CrCl > 30 mL/min)


AND


*Sequential Compression Device (SCD)











Assessment and Plan


Assessment and Plan


//Possible acute ischemic stroke


= Uncertain timing of onset of slurred speech.


= No reported changes in medications


= Imaging negative for acute stroke, however chronic strokes noted.


= Ultrasound carotids negative for stenosis.


Status post aspirin.


= Echocardiogram ordered and pending.


= Neurology consulted.  Appreciate assistance.





//COPD.  Respiratory status stable.  Continue home meds.





//BRONSON on CKD4.


= Creatinine 4.4 from baseline of 2.  Consult nephrology.  Renal ultrasound 

ordered and pending.  Start on IV fluids.





//Diabetes mellitus.  Chronic.  Diabetic diet.  Insulin sliding scale.





//Hypertension.  Permissive hypertension.  Continue to monitor.


Discussed Condition With


Patient, nurse, ED physician.





Physician Certification


2 Midnight Certification Type:  Admission for Inpatient Services


Order for Inpatient Services


The services are ordered in accordance with Medicare regulations or non-

Medicare payer requirements, as applicable.  In the case of services not 

specified as inpatient-only, they are appropriately provided as inpatient 

services in accordance with the 2-midnight benchmark.


Estimated LOS (days):  2


 days is the estimated time the patient will need to remain in the hospital, 

assuming treatment plan goals are met and no additional complications.


Post-Hospital Plan:  Not yet determined











Cameron Sanches MD May 8, 2018 01:26

## 2018-05-08 NOTE — RADRPT
EXAM DATE/TIME:  2018 10:05 

 

[facility] COMPARISON:     

CT BRAIN W/O CONTRAST, May 07, 2018, 22:47.

       

 

 

INDICATIONS :     

Dysthria.

                     

 

MEDICAL HISTORY :     

Hypertension. Diabetes mellitus type 2. Hypercholesterolemia. Stroke

 

SURGICAL HISTORY :     

 section.     Lt breast cyst removed

 

ENCOUNTER:     

Subsequent

 

ACUITY:     

2 day

 

PAIN SCORE:     

0/10

 

LOCATION:       

cranial 

 

TECHNIQUE:     

Multiplanar, multisequence MRI of the brain was performed without contrast.

 

FINDINGS:     

 

CEREBRUM:     

The ventricles are normal for age.  No evidence of midline shift, mass lesion, hemorrhage or acute in
farction.  There may be old lacunar type infarcts in the left basal ganglia. No extraaxial fluid zane
ections are seen.  The pituitary gland and suprasellar cistern are normal in configuration.

 

WHITE MATTER:     

Severe periventricular and scattered deep white matter tract there is a small vessel ischemic demyeli
nation

 

POSTERIOR FOSSA:     

Old lacunar type infarct in the right side of the tawana. Encephalomalacia changes indicative of old in
farcts in the superior aspect of the right cerebellar hemisphere and an inferior left cerebellar infa
rct

 

DIFFUSION IMAGING:     

No focal areas of restricted diffusion are seen.  No evidence of acute infarction.

 

EXTRACRANIAL:     

The visualized portions of the orbits and paranasal sinuses are unremarkable.

 

CONCLUSION:     

1. Severe chronic changes with old infarcts involving the inferior aspect of the left cerebellar jason
sphere, superior aspect of the right cerebellar hemisphere, lacunar type infarct in the right side of
 the tawana and punctate lacunar type infarcts in the left basal ganglia.

2. Severe periventricular and scattered deep white matter tract areas of small vessel ischemic demyel
ination.

3. Nothing acute

 

 

 

 Adan Luther MD on May 08, 2018 at 10:31           

Board Certified Radiologist.

 This report was verified electronically.

## 2018-05-08 NOTE — RADRPT
EXAM DATE/TIME:  2018 07:41 

 

[facility] COMPARISON:     

No previous studies available for comparison.

 

EXTERNAL COMPARISON :    

[facility], [place], BILATERAL, 2017

 

 

INDICATIONS :     

Increased BUN/creatinine. 

                     

 

MEDICAL HISTORY :     

Hypercholesterolemia. Gastroparesis. Arthritis. CVA. Hyperlipidemia. HTN. COPD. Pneumonia. Dyspnea. G
ERD. Chronic kidney disease. Gout. Diabetes. Anemia. Clotting problems. 

 

SURGICAL HISTORY :     

 section. Tubal ligation.   Cardiac cath. Left breast abscess drainage x2. Blood transfusions
. 

 

ENCOUNTER:     

Subsequent

 

ACUITY:     

1 day

 

PAIN SCORE:     

0/10

 

LOCATION:     

Bilateral flank 

MEASUREMENTS:     

 

RIGHT KIDNEY:     

13.7 x 6.1 x 4.8 cm

 

LEFT KIDNEY:     

11.9 x 5.1 x 4.8 cm

 

FINDINGS:     

Mild right-sided hydronephrosis. Right kidney is enlarged to 13.7 cm in length.

 

Both kidneys have increased echogenicity characteristic of medical renal disease. Also minimal left h
ydronephrosis.

 

CONCLUSION:     

1. Mild hydronephrosis. Mild medical renal disease. Bladder unremarkable.

 

 

 

 Gabe Brock MD on May 08, 2018 at 9:04           

Board Certified Radiologist.

 This report was verified electronically.

## 2018-05-09 VITALS
SYSTOLIC BLOOD PRESSURE: 173 MMHG | TEMPERATURE: 97.8 F | DIASTOLIC BLOOD PRESSURE: 87 MMHG | OXYGEN SATURATION: 98 % | RESPIRATION RATE: 18 BRPM | HEART RATE: 77 BPM

## 2018-05-09 VITALS
RESPIRATION RATE: 16 BRPM | SYSTOLIC BLOOD PRESSURE: 162 MMHG | DIASTOLIC BLOOD PRESSURE: 95 MMHG | TEMPERATURE: 98.4 F | OXYGEN SATURATION: 99 % | HEART RATE: 74 BPM

## 2018-05-09 VITALS
OXYGEN SATURATION: 99 % | TEMPERATURE: 97.9 F | HEART RATE: 70 BPM | SYSTOLIC BLOOD PRESSURE: 180 MMHG | RESPIRATION RATE: 19 BRPM | DIASTOLIC BLOOD PRESSURE: 93 MMHG

## 2018-05-09 VITALS — HEART RATE: 75 BPM

## 2018-05-09 VITALS
HEART RATE: 74 BPM | SYSTOLIC BLOOD PRESSURE: 178 MMHG | OXYGEN SATURATION: 95 % | RESPIRATION RATE: 19 BRPM | DIASTOLIC BLOOD PRESSURE: 92 MMHG | TEMPERATURE: 97.5 F

## 2018-05-09 VITALS
SYSTOLIC BLOOD PRESSURE: 189 MMHG | HEART RATE: 71 BPM | OXYGEN SATURATION: 100 % | TEMPERATURE: 98 F | RESPIRATION RATE: 20 BRPM | DIASTOLIC BLOOD PRESSURE: 96 MMHG

## 2018-05-09 VITALS
HEART RATE: 78 BPM | DIASTOLIC BLOOD PRESSURE: 89 MMHG | RESPIRATION RATE: 20 BRPM | TEMPERATURE: 97.8 F | SYSTOLIC BLOOD PRESSURE: 175 MMHG | OXYGEN SATURATION: 98 %

## 2018-05-09 VITALS — HEART RATE: 72 BPM

## 2018-05-09 VITALS — HEART RATE: 67 BPM

## 2018-05-09 LAB
ALBUMIN SERPL-MCNC: 2.6 GM/DL (ref 3.4–5)
ALP SERPL-CCNC: 93 U/L (ref 45–117)
ALT SERPL-CCNC: 29 U/L (ref 10–53)
AST SERPL-CCNC: 15 U/L (ref 15–37)
BASOPHILS # BLD AUTO: 0 TH/MM3 (ref 0–0.2)
BASOPHILS NFR BLD: 1 % (ref 0–2)
BILIRUB SERPL-MCNC: 0.3 MG/DL (ref 0.2–1)
BUN SERPL-MCNC: 67 MG/DL (ref 7–18)
CALCIUM SERPL-MCNC: 8.5 MG/DL (ref 8.5–10.1)
CHLORIDE SERPL-SCNC: 114 MEQ/L (ref 98–107)
CHOLEST SERPL-MCNC: 145 MG/DL (ref 120–200)
CHOLESTEROL/ HDL RATIO: 1.94 RATIO
CREAT SERPL-MCNC: 3.91 MG/DL (ref 0.5–1)
EOSINOPHIL # BLD: 0.5 TH/MM3 (ref 0–0.4)
EOSINOPHIL NFR BLD: 10.5 % (ref 0–4)
ERYTHROCYTE [DISTWIDTH] IN BLOOD BY AUTOMATED COUNT: 15.1 % (ref 11.6–17.2)
GFR SERPLBLD BASED ON 1.73 SQ M-ARVRAT: 15 ML/MIN (ref 89–?)
GLUCOSE SERPL-MCNC: 121 MG/DL (ref 74–106)
HCO3 BLD-SCNC: 21.2 MEQ/L (ref 21–32)
HCT VFR BLD CALC: 28.5 % (ref 35–46)
HDLC SERPL-MCNC: 74.5 MG/DL (ref 40–60)
HGB BLD-MCNC: 9.3 GM/DL (ref 11.6–15.3)
LDLC SERPL-MCNC: 51 MG/DL (ref 0–99)
LYMPHOCYTES # BLD AUTO: 1.2 TH/MM3 (ref 1–4.8)
LYMPHOCYTES NFR BLD AUTO: 24.8 % (ref 9–44)
MCH RBC QN AUTO: 26.5 PG (ref 27–34)
MCHC RBC AUTO-ENTMCNC: 32.6 % (ref 32–36)
MCV RBC AUTO: 81.2 FL (ref 80–100)
MONOCYTE #: 0.5 TH/MM3 (ref 0–0.9)
MONOCYTES NFR BLD: 9.7 % (ref 0–8)
NEUTROPHILS # BLD AUTO: 2.7 TH/MM3 (ref 1.8–7.7)
NEUTROPHILS NFR BLD AUTO: 54 % (ref 16–70)
PHOSPHATE SERPL-MCNC: 5.1 MG/DL (ref 2.5–4.9)
PLATELET # BLD: 225 TH/MM3 (ref 150–450)
PMV BLD AUTO: 7.6 FL (ref 7–11)
PROT SERPL-MCNC: 6.2 GM/DL (ref 6.4–8.2)
RBC # BLD AUTO: 3.51 MIL/MM3 (ref 4–5.3)
SODIUM SERPL-SCNC: 146 MEQ/L (ref 136–145)
TRIGL SERPL-MCNC: 97 MG/DL (ref 42–150)
WBC # BLD AUTO: 4.9 TH/MM3 (ref 4–11)

## 2018-05-09 RX ADMIN — Medication SCH ML: at 22:18

## 2018-05-09 RX ADMIN — INSULIN ASPART SCH: 100 INJECTION, SOLUTION INTRAVENOUS; SUBCUTANEOUS at 18:07

## 2018-05-09 RX ADMIN — IPRATROPIUM BROMIDE AND ALBUTEROL SULFATE PRN AMPULE: .5; 3 SOLUTION RESPIRATORY (INHALATION) at 11:49

## 2018-05-09 RX ADMIN — INSULIN ASPART SCH: 100 INJECTION, SOLUTION INTRAVENOUS; SUBCUTANEOUS at 08:53

## 2018-05-09 RX ADMIN — Medication SCH ML: at 08:53

## 2018-05-09 RX ADMIN — CLOPIDOGREL BISULFATE SCH MG: 75 TABLET, FILM COATED ORAL at 08:50

## 2018-05-09 RX ADMIN — PHENYTOIN SODIUM SCH MLS/HR: 50 INJECTION INTRAMUSCULAR; INTRAVENOUS at 16:08

## 2018-05-09 RX ADMIN — INSULIN ASPART SCH: 100 INJECTION, SOLUTION INTRAVENOUS; SUBCUTANEOUS at 13:30

## 2018-05-09 RX ADMIN — HEPARIN SODIUM SCH UNITS: 10000 INJECTION, SOLUTION INTRAVENOUS; SUBCUTANEOUS at 08:53

## 2018-05-09 RX ADMIN — ATORVASTATIN CALCIUM SCH MG: 80 TABLET, FILM COATED ORAL at 22:16

## 2018-05-09 RX ADMIN — NIFEDIPINE SCH MG: 90 TABLET, FILM COATED, EXTENDED RELEASE ORAL at 08:50

## 2018-05-09 RX ADMIN — ASPIRIN 81 MG SCH MG: 81 TABLET ORAL at 08:53

## 2018-05-09 RX ADMIN — HEPARIN SODIUM SCH UNITS: 10000 INJECTION, SOLUTION INTRAVENOUS; SUBCUTANEOUS at 22:16

## 2018-05-09 RX ADMIN — PHENYTOIN SODIUM SCH MLS/HR: 50 INJECTION INTRAMUSCULAR; INTRAVENOUS at 04:10

## 2018-05-09 RX ADMIN — INSULIN ASPART SCH: 100 INJECTION, SOLUTION INTRAVENOUS; SUBCUTANEOUS at 21:00

## 2018-05-09 NOTE — HHI.PR
Subjective


Remarks


in no acute distress.


no focal weakness.


no new complaints.





Objective


Vitals





Vital Signs








  Date Time  Temp Pulse Resp B/P (MAP) Pulse Ox O2 Delivery O2 Flow Rate FiO2


 


5/9/18 07:55 97.5 74 19 178/92 (120) 95   


 


5/9/18 05:48 97.8 78 20 175/89 (117) 98   


 


5/9/18 02:19  75      


 


5/9/18 00:50        21


 


5/9/18 00:18 97.8 77 18 173/87 (115) 98   


 


5/8/18 22:30  74      


 


5/8/18 20:36 98.0 73 20 176/90 (118) 99   


 


5/8/18 17:44 97.3 77 20 175/94 (121) 94   


 


5/8/18 16:15 98.2 75 18 137/101 (113) 100   


 


5/8/18 12:30    175/100 (125)    


 


5/8/18 11:35  77 22 204/114 (144) 100   


 


5/8/18 09:09  74 24 183/103 (129) 98   














I/O      


 


 5/8/18 5/8/18 5/8/18 5/9/18 5/9/18 5/9/18





 07:00 15:00 23:00 07:00 15:00 23:00


 


Intake Total    1000 ml  


 


Balance    1000 ml  


 


      


 


Intake IV Total    1000 ml  


 


# Voids 2   5  


 


# Bowel Movements    1  








Result Diagram:  


5/7/18 2245                                                                    

            5/8/18 1145





Imaging





Last Impressions








Renal Ultrasound 5/8/18 0000 Signed





Impressions: 





 Service Date/Time:  Tuesday, May 8, 2018 07:41 - CONCLUSION:  1. Mild 





 hydronephrosis. Mild medical renal disease. Bladder unremarkable.     Gabe Brock MD 


 


Brain MRI 5/8/18 0000 Signed





Impressions: 





 Service Date/Time:  Tuesday, May 8, 2018 10:05 - CONCLUSION:  1. Severe 

chronic 





 changes with old infarcts involving the inferior aspect of the left cerebellar 





 hemisphere, superior aspect of the right cerebellar hemisphere, lacunar type 





 infarct in the right side of the tawana and punctate lacunar type infarcts in 

the 





 left basal ganglia. 2. Severe periventricular and scattered deep white matter 





 tract areas of small vessel ischemic demyelination. 3. Nothing acute     Adan Luther MD 


 


Abdomen/Pelvis CT 5/8/18 0000 Signed





Impressions: 





 Service Date/Time:  Tuesday, May 8, 2018 10:25 - CONCLUSION:  1. There is some 





 asymmetric prominence of the left renal pelvis but I do not see rukhsana 





 hydronephrosis. Contrast is seen in both renal collecting systems from the 

prior 





 contrasted CTA of the carotids and intracranial vessels. 2. Enlarged, fibroid 





 uterus. 3. Vicarious excretion of contrast in the gallbladder lumen. 4. 





 Parenchymal cyst in the left lingula/lung base with some pleural parenchymal 





 scarring or atelectasis in the medial right base and left lingula. 5. Possible 





 small, 2.1 cm left ovarian cyst.     Adan Luther MD 


 


Neck CTA 5/7/18 2242 Signed





Impressions: 





 Service Date/Time:  Monday, May 7, 2018 22:49 - CONCLUSION:  Mild 





 atherosclerotic disease bilaterally in the internal carotid bulb. No 





 two-dimensional stenosis is noted.     Delvis Lozada MD 


 


Head CTA 5/7/18 2242 Signed





Impressions: 





 Service Date/Time:  Monday, May 7, 2018 22:49 - CONCLUSION:  Limit exam 

possibly 





 related to motion. I don't see any definite filling defects or obvious 

stenoses 





 but it is a limited exam.     Delvis Lozada MD 


 


Head CT 5/7/18 0000 Signed





Impressions: 





 Service Date/Time:  Monday, May 7, 2018 22:47 - CONCLUSION:  Multiple old 





 strokes including the cerebellar hemispheres. No evidence of hemorrhage or 





 edema.  This report was called to Dr. Chacon at <2259 hrs.>.       Delvis Lozada MD 








Objective Remarks


GENERAL: This is a well-nourished, well-developed patient, in no apparent 

distress.


CARDIOVASCULAR: Regular rate and regular rhythm without murmurs, gallops, or 

rubs. 


RESPIRATORY: Clear to auscultation. Breath sounds equal bilaterally. No wheezes

, rales, or rhonchi.  


GASTROINTESTINAL: Abdomen soft, non-tender, nondistended. 


Normal, active bowel sounds


MUSCULOSKELETAL: Extremities without clubbing, cyanosis, or edema.


NEURO:  Alert & Oriented x4 to person, place, time, situation.  Moves all ext x4


Medications and IVs





Inpatient Medications


Albuterol Sulfate (Proair Hfa Inh) 2 puff Q4H  PRN INH SHORTNESS OF BREATH;  

Start 5/8/18 at 01:30


Aspirin (Aspirin Chew) 81 mg DAILY CHEW  Last administered on 5/8/18at 10:41;  

Start 5/8/18 at 09:00


Atorvastatin Calcium (Lipitor) 40 mg HS PO  Last administered on 5/8/18at 21:13

;  Start 5/8/18 at 21:00


Bisacodyl (Dulcolax Supp) 10 mg DAILY  PRN RECTAL SEVERE CONSITIPATION;  Start 5 /8/18 at 00:15


Clopidogrel Bisulfate (Plavix) 75 mg DAILY PO  Last administered on 5/8/18at 17:

23;  Start 5/8/18 at 16:15


Enalaprilat (Vasotec Inj) 1.25 mg Q4H  PRN IV PUSH For SBP > 220 or DBP > 120;  

Start 5/8/18 at 12:00


Heparin Sodium (Porcine) (Heparin Inj) 5,000 units Q12H SQ  Last administered 

on 5/8/18at 21:14;  Start 5/8/18 at 09:00


Insulin Aspart (NovoLOG SUPPLEMENTAL SCALE) 1 ACHS SLIDING  SCALE SQ ;  Start 5/ 8/18 at 08:00


Lactulose (Lactulose Liq) 30 ml DAILY  PRN PO SEVERE CONSITIPATION;  Start 5/8/ 18 at 00:15


Magnesium Hydroxide (Milk Of Magnesia Liq) 30 ml Q12H  PRN PO Mild constipation

;  Start 5/8/18 at 00:15


Naloxone HCl (Narcan Inj) 0.4 mg UNSCH  PRN IV PUSH SEE LABEL COMMENTS;  Start 5 /8/18 at 00:15


Nifedipine (Procardia Xl) 90 mg DAILY PO  Last administered on 5/8/18at 13:17;  

Start 5/8/18 at 13:00


Ondansetron HCl (Zofran Inj) 4 mg Q6H  PRN IVP NAUSEA OR VOMITING;  Start 5/8/ 18 at 00:15


Sennosides (Senokot) 17.2 mg Q12H  PRN PO Moderate constipation;  Start 5/8/18 

at 00:15


Sodium Chloride (NS Flush) 2 ml BID IV FLUSH ;  Start 5/8/18 at 09:00





A/P


Assessment and Plan


A/P  





possible TIA


 Imaging negative for acute stroke, however chronic strokes noted.


Ultrasound carotids negative for stenosis.


sinus rhythm on telemetry.


echo and lipid panel pending.


continue aspirin, plavix.


neurology consult appreciated.


PT consulted.





COPD.  Respiratory status stable.  Continue home meds.





BRONSON on CKD4.


CT of the abdomen with no hydronephrosis.


BMP today pending.


neurology following.





Diabetes mellitus.  Chronic.  Diabetic diet.  Insulin sliding scale.





Hypertension. continue Nifedipine- will consider adding hydralazine if BP 

remains elevated.





DVT prophylaxis with subq Heparin.


Discharge Planning


dc home within the next 24hrs if renal function stable and cleared by neurology 

and nephrology.


pending echo.











Frankie Foreman MD May 9, 2018 08:20

## 2018-05-09 NOTE — HHI.NPPN
Subjective


General Problems:  Anemia


Renal Failure:  Chronic, Acute, Stage IV


Interval History


She is wanting to go home. Renal function yesterday was slightly better than 

admission. GFR stable today. 


 (Kylah Leslie)





Objective Data


Data





Vital Signs








  Date Time  Temp Pulse Resp B/P (MAP) Pulse Ox O2 Delivery O2 Flow Rate FiO2


 


5/9/18 11:58 97.9 70 19 180/93 (122) 99   


 


5/9/18 08:00  72      


 


5/9/18 07:55 97.5 74 19 178/92 (120) 95   


 


5/9/18 05:48 97.8 78 20 175/89 (117) 98   


 


5/9/18 02:19  75      


 


5/9/18 00:50        21


 


5/9/18 00:18 97.8 77 18 173/87 (115) 98   


 


5/8/18 22:30  74      


 


5/8/18 20:36 98.0 73 20 176/90 (118) 99   


 


5/8/18 17:44 97.3 77 20 175/94 (121) 94   


 


5/8/18 16:15 98.2 75 18 137/101 (113) 100   


 


5/8/18 12:30    175/100 (125)    








 (Kylah Leslie)


-:  


5/9/18 0805                                                                    

            5/9/18 0805





Imaging





Last 72 hours Impressions








Renal Ultrasound 5/8/18 0000 Signed





Impressions: 





 Service Date/Time:  Tuesday, May 8, 2018 07:41 - CONCLUSION:  1. Mild 





 hydronephrosis. Mild medical renal disease. Bladder unremarkable.     Gabe Brock MD 


 


Brain MRI 5/8/18 0000 Signed





Impressions: 





 Service Date/Time:  Tuesday, May 8, 2018 10:05 - CONCLUSION:  1. Severe 

chronic 





 changes with old infarcts involving the inferior aspect of the left cerebellar 





 hemisphere, superior aspect of the right cerebellar hemisphere, lacunar type 





 infarct in the right side of the tawana and punctate lacunar type infarcts in 

the 





 left basal ganglia. 2. Severe periventricular and scattered deep white matter 





 tract areas of small vessel ischemic demyelination. 3. Nothing acute     Adan Luther MD 


 


Abdomen/Pelvis CT 5/8/18 0000 Signed





Impressions: 





 Service Date/Time:  Tuesday, May 8, 2018 10:25 - CONCLUSION:  1. There is some 





 asymmetric prominence of the left renal pelvis but I do not see rukhsana 





 hydronephrosis. Contrast is seen in both renal collecting systems from the 

prior 





 contrasted CTA of the carotids and intracranial vessels. 2. Enlarged, fibroid 





 uterus. 3. Vicarious excretion of contrast in the gallbladder lumen. 4. 





 Parenchymal cyst in the left lingula/lung base with some pleural parenchymal 





 scarring or atelectasis in the medial right base and left lingula. 5. Possible 





 small, 2.1 cm left ovarian cyst.     Adan Luther MD 


 


Neck CTA 5/7/18 2242 Signed





Impressions: 





 Service Date/Time:  Monday, May 7, 2018 22:49 - CONCLUSION:  Mild 





 atherosclerotic disease bilaterally in the internal carotid bulb. No 





 two-dimensional stenosis is noted.     Delvis Lozada MD 


 


Head CTA 5/7/18 2242 Signed





Impressions: 





 Service Date/Time:  Monday, May 7, 2018 22:49 - CONCLUSION:  Limit exam 

possibly 





 related to motion. I don't see any definite filling defects or obvious 

stenoses 





 but it is a limited exam.     Delvis Lozada MD 


 


Head CT 5/7/18 0000 Signed





Impressions: 





 Service Date/Time:  Monday, May 7, 2018 22:47 - CONCLUSION:  Multiple old 





 strokes including the cerebellar hemispheres. No evidence of hemorrhage or 





 edema.  This report was called to Dr. Chacon at <2259 hrs.>.       Delvis Lozada MD 








 (Kylah Leslie)





Physical Exam


General


Appearance:  Well Developed, No Acute Distress, Comfortable


 (Kylah Leslie)





Throat


Throat Exam:  Oral Mucosa Pink & Moist


 (Kylah Leslie)





Pulmonary


Resp Exam:  Clear Bilaterally, Breath Sounds Equal


 (Kylah Leslie)





Cardiology


CV Exam:  Regular, Normal Sinus Rhythm


 (Kylah Leslie)





Gastrointestinal/Abdomen


GI Exam:  Soft, Non-Tender, Bowel Sounds Present


 (Kylah Leslie)





Musculoskeletal


MS Exam:  Joints Intact, Normal Gait, Normal Tone


 (Kylah Leslie)





Integumentary


Skin Exam:  Clear, Warm, Dry, Intact


 (Kylah Leslie)





Extremeties


Extremities Exam:  No Edema, Pedal Pulses Palpable


 (Kylah Leslie)





Neurologic


Neuro Exam:  Alert, Awake, Oriented, Speech Clear, Moving All Extremities


 (Kylah Leslie)





Psychiatric


Psych Exam:  Appropriate Responses


 (Kylah Leslie)





Assessment/Plan


Discussed Condition With:  Patient


Assessment Summary:  BRONSON/Acute Renal Failure, Anemia of CKD, Proteinuria, 

Hypertension, Diabetes Mellitus, CKD Stage IV


Problem List:  


(1) Acute kidney injury superimposed on chronic kidney disease


ICD Codes:  N17.9 - Acute kidney failure, unspecified; N18.9 - Chronic kidney 

disease, unspecified


Plan:  BRONSON on CKD 4, baseline creatinine 3.4


She has biopsy proven diabetic nephropathy with heavy proteinuria. She is at 

risk for progressive renal decline. 


We discussed possibility of needing dialysis in the near future. No indication 

for HD now.


She is non oliguric


Given IV contrast, stop IVF


We will follow in CKD clinic if discharged 


CT reviewed, no hydronephrosis 


Avoid nephrotoxic agents


Monitor urine output





(2) CVA (cerebral infarction)


ICD Codes:  I63.9 - CVA (cerebral infarction)


Status:  Acute


Plan:  vs TIA


Imaging reviewed, symptoms resolved. 





(3) Diabetes mellitus


ICD Codes:  E11.9 - Diabetes mellitus


Status:  Chronic


Plan:  Maintain glucose 140-180mg/dL while hospitalized 





(4) Hypertension


ICD Codes:  I10 - Hypertension


Status:  Acute


Plan:  Monitor blood pressure, resume nifedipine


Stop IVF


 (Kylah Leslie)


Plan


patient was seen and examined. Poor renal function, she is going to need 

dialysis soon in the near future. 


 (Moose Levi MD)





Problem Qualifiers





(1) Diabetes mellitus:  








Kylah Leslie May 9, 2018 12:11


Moose Levi MD May 9, 2018 21:02

## 2018-05-09 NOTE — ECHRPT
Indication:   CVA/TIA

 

 CONCLUSIONS

 Normal left ventricular size. 

 The left ventricular systolic function is low normal with an estimated ejection fraction in the rang
e of 50-

 55%. 

 Mild concentric left ventricular hypertrophy. 

 The left atrial size is mild-to-moderately dilated. 

 The right atrial size is mild-to-moderately dilated. 

 A patent foramen ovale is present with a left-to-right shunt demonstrated by color flow Doppler 

 interrogation. 

 Mitral valve sclerosis.

 Mild mitral valve regurgitation. 

 Mild mitral annular calcification. 

 Aortic valve sclerosis is present. 

 Trace aortic valve regurgitation. 

 There is trace tricuspid valve regurgitation. 

 The estimated pulmonary arterial pressure is 42 mmHg. 

 

 

 

 

 BP:  146   / 92      HR: 75                       Rhythm:           Sinus

 

 MEASUREMENTS  (Male / Female) Normal Values       Technical Quality:Fair

 2D ECHO

 LV Diastolic Diameter PLAX        5.7 cm                4.2 - 5.9 / 3.9 - 5.3 cm

 LV Systolic Diameter PLAX         4.1 cm                

 IVS Diastolic Thickness           1.2 cm                0.6 - 1.0 / 0.6 - 0.9 cm

 LVPW Diastolic Thickness          1.2 cm                0.6 - 1.0 / 0.6 - 0.9 cm

 LV Relative Wall Thickness        0.4                   

 RV Internal Dim ED PLAX           3.1 cm                

 LVOT Diameter                     2.0 cm                

 Aortic Root Diameter              3.0 cm                

 LA Systolic Diameter LX           5.2 cm                3.0 - 4.0 / 2.7 - 3.8 cm

 

 M-MODE

 AV Cusp Separation MM             1.6 cm                

 

 DOPPLER

 AV Peak Velocity                  125.0 cm/s            

 AV Peak Gradient                  6.3 mmHg              

 AV Mean Gradient                  3.0 mmHg              

 AV Velocity Time Integral         25.1 cm               

 LVOT Peak Velocity                65.4 cm/s             

 LVOT Peak Gradient                1.7 mmHg              

 LVOT Velocity Time Integral       14.2 cm               

 AV Area Cont Eq vti               1.8 cm               

 AV Area Cont Eq pk                1.6 cm               

 LV E' Lateral Velocity            2.9 cm/s              

 LV E' Septal Velocity             5.0 cm/s              

 TR Peak Velocity                  282.0 cm/s            

 TR Peak Gradient                  31.8 mmHg             

 Right Atrial Pressure             10.0 mmHg             

 Pulmonary Artery Systolic Pressu  41.8 mmHg             

 Right Ventricular Systolic Press  41.8 mmHg             

 PV Peak Velocity                  64.7 cm/s             

 PV Peak Gradient                  1.7 mmHg              

 

 

 FINDINGS

 

 LEFT VENTRICLE

 Normal left ventricular size. 

 The left ventricular systolic function is low normal with an estimated ejection fraction in the rang
e of 50-

 55%. 

 Mild concentric left ventricular hypertrophy. 

 

 

 RIGHT VENTRICLE

 Normal right ventricular size and systolic function. 

 

 LEFT ATRIUM

 The left atrial size is mild-to-moderately dilated. 

 

 RIGHT ATRIUM

 The right atrial size is mild-to-moderately dilated. 

 

 ATRIAL SEPTUM

 A patent foramen ovale is present with a left-to-right shunt demonstrated by color flow Doppler 

 interrogation. 

 

 AORTA

 The aortic root and proximal ascending aorta are normal in size on limited imaging. 

 

 MITRAL VALVE

 Mild mitral valve regurgitation. 

 Mild mitral annular calcification. 

 

 AORTIC VALVE

 Aortic valve sclerosis is present. 

 Trace aortic valve regurgitation. 

 

 TRICUSPID VALVE

 There is trace tricuspid valve regurgitation. 

 The estimated pulmonary arterial pressure is 41.8 mmHg. 

 

 PULMONARY VALVE

 No pulmonary valve regurgitation or stenosis. 

 

 VESSELS

 The inferior vena cava is normal in size. 

 

 PERICARDIUM

 No pericardial effusion. 

 

 

 

 

  Tracy Simpson MD, FACC

  (Electronically Signed)

  Final Date:09 May 2018 19:09

## 2018-05-10 VITALS
HEART RATE: 75 BPM | TEMPERATURE: 97.9 F | DIASTOLIC BLOOD PRESSURE: 79 MMHG | OXYGEN SATURATION: 99 % | RESPIRATION RATE: 18 BRPM | SYSTOLIC BLOOD PRESSURE: 152 MMHG

## 2018-05-10 VITALS
OXYGEN SATURATION: 98 % | RESPIRATION RATE: 18 BRPM | DIASTOLIC BLOOD PRESSURE: 93 MMHG | HEART RATE: 77 BPM | SYSTOLIC BLOOD PRESSURE: 183 MMHG

## 2018-05-10 VITALS — HEART RATE: 73 BPM

## 2018-05-10 VITALS
SYSTOLIC BLOOD PRESSURE: 147 MMHG | TEMPERATURE: 98 F | DIASTOLIC BLOOD PRESSURE: 96 MMHG | HEART RATE: 74 BPM | RESPIRATION RATE: 16 BRPM | OXYGEN SATURATION: 96 %

## 2018-05-10 VITALS
RESPIRATION RATE: 14 BRPM | HEART RATE: 70 BPM | OXYGEN SATURATION: 95 % | SYSTOLIC BLOOD PRESSURE: 151 MMHG | DIASTOLIC BLOOD PRESSURE: 84 MMHG | TEMPERATURE: 97.6 F

## 2018-05-10 VITALS
HEART RATE: 84 BPM | RESPIRATION RATE: 18 BRPM | OXYGEN SATURATION: 97 % | DIASTOLIC BLOOD PRESSURE: 89 MMHG | SYSTOLIC BLOOD PRESSURE: 166 MMHG

## 2018-05-10 VITALS — OXYGEN SATURATION: 97 %

## 2018-05-10 VITALS — HEART RATE: 92 BPM

## 2018-05-10 VITALS — HEART RATE: 78 BPM

## 2018-05-10 LAB
BUN SERPL-MCNC: 60 MG/DL (ref 7–18)
CALCIUM SERPL-MCNC: 8.5 MG/DL (ref 8.5–10.1)
CHLORIDE SERPL-SCNC: 115 MEQ/L (ref 98–107)
CREAT SERPL-MCNC: 3.88 MG/DL (ref 0.5–1)
GFR SERPLBLD BASED ON 1.73 SQ M-ARVRAT: 15 ML/MIN (ref 89–?)
GLUCOSE SERPL-MCNC: 110 MG/DL (ref 74–106)
HCO3 BLD-SCNC: 21.2 MEQ/L (ref 21–32)
SODIUM SERPL-SCNC: 147 MEQ/L (ref 136–145)

## 2018-05-10 RX ADMIN — Medication SCH ML: at 21:32

## 2018-05-10 RX ADMIN — INSULIN ASPART SCH: 100 INJECTION, SOLUTION INTRAVENOUS; SUBCUTANEOUS at 11:16

## 2018-05-10 RX ADMIN — ASPIRIN 81 MG SCH MG: 81 TABLET ORAL at 07:23

## 2018-05-10 RX ADMIN — INSULIN ASPART SCH: 100 INJECTION, SOLUTION INTRAVENOUS; SUBCUTANEOUS at 07:23

## 2018-05-10 RX ADMIN — IPRATROPIUM BROMIDE AND ALBUTEROL SULFATE PRN AMPULE: .5; 3 SOLUTION RESPIRATORY (INHALATION) at 02:55

## 2018-05-10 RX ADMIN — HEPARIN SODIUM SCH UNITS: 10000 INJECTION, SOLUTION INTRAVENOUS; SUBCUTANEOUS at 07:24

## 2018-05-10 RX ADMIN — IPRATROPIUM BROMIDE AND ALBUTEROL SULFATE PRN AMPULE: .5; 3 SOLUTION RESPIRATORY (INHALATION) at 13:33

## 2018-05-10 RX ADMIN — HEPARIN SODIUM SCH UNITS: 10000 INJECTION, SOLUTION INTRAVENOUS; SUBCUTANEOUS at 21:32

## 2018-05-10 RX ADMIN — NIFEDIPINE SCH MG: 90 TABLET, FILM COATED, EXTENDED RELEASE ORAL at 07:24

## 2018-05-10 RX ADMIN — CLOPIDOGREL BISULFATE SCH MG: 75 TABLET, FILM COATED ORAL at 07:24

## 2018-05-10 RX ADMIN — PHENYTOIN SODIUM SCH MLS/HR: 50 INJECTION INTRAMUSCULAR; INTRAVENOUS at 01:14

## 2018-05-10 RX ADMIN — ATORVASTATIN CALCIUM SCH MG: 80 TABLET, FILM COATED ORAL at 21:31

## 2018-05-10 RX ADMIN — INSULIN ASPART SCH: 100 INJECTION, SOLUTION INTRAVENOUS; SUBCUTANEOUS at 21:53

## 2018-05-10 RX ADMIN — INSULIN ASPART SCH: 100 INJECTION, SOLUTION INTRAVENOUS; SUBCUTANEOUS at 16:07

## 2018-05-10 RX ADMIN — Medication SCH ML: at 07:24

## 2018-05-10 NOTE — HHI.PR
Review/Management


Diagnosis/Plan:  


(1) Slurred speech


ICD Codes:  R47.81 - Slurred speech


Status:  Resolved


Plan:  no longer having slurred speech. possible exacerbation of previous stroke


CT did not show acute event


CTA head/neck unremarkable


check MRI brain- old strokes. moderate white matter dz. likely 2/2 chronic 

uncontrolled htn








recs


neuro stable


aspirin and plavix


d/w pt importance of compliance with medications


on statin


bp goal <120/80


d/c planning from neuro





(2) Hypertension


ICD Codes:  I10 - Hypertension


Status:  Acute


Plan:  elevated BP on admission, will continue to monitor 





pt aware of importance of controlling for secondary stroke risk factors as outpt





(3) Dyslipidemia


ICD Codes:  E78.5 - Dyslipidemia


Status:  Acute


Plan:  lipid panel pending


goal LDL 70





(4) Acute renal failure superimposed on stage 3 chronic kidney disease


ICD Codes:  N17.9 - Acute kidney failure, unspecified; N18.3 - Chronic kidney 

disease, stage 3 (moderate)


Plan:  CR 4.4








Subjective


Subjective Comments


No acute events reported


No headache


No chest pain


No dyspnea


Active Medications





Current Medications








 Medications


  (Trade)  Dose


 Ordered  Sig/Danielle


 Route  Start Time


 Stop Time Status Last Admin


 


 Sodium Chloride  1,000 ml @ 


 100 mls/hr  Q10H


 IV  5/8/18 00:07


    5/10/18 01:14


 


 


  (NS Flush)  2 ml  UNSCH  PRN


 IV FLUSH  5/8/18 00:15


     


 


 


  (NS Flush)  2 ml  BID


 IV FLUSH  5/8/18 09:00


    5/9/18 22:18


 


 


  (Heparin Inj)  5,000 units  Q12H


 SQ  5/8/18 09:00


    5/10/18 07:24


 


 


  (Narcan Inj)  0.4 mg  UNSCH  PRN


 IV PUSH  5/8/18 00:15


     


 


 


  (Milk Of


 Magnesia Liq)  30 ml  Q12H  PRN


 PO  5/8/18 00:15


     


 


 


  (Senokot)  17.2 mg  Q12H  PRN


 PO  5/8/18 00:15


     


 


 


  (Dulcolax Supp)  10 mg  DAILY  PRN


 RECTAL  5/8/18 00:15


     


 


 


  (Lactulose Liq)  30 ml  DAILY  PRN


 PO  5/8/18 00:15


     


 


 


  (NovoLOG


 SUPPLEMENTAL


 SCALE)  1  ACHS SLIDING  SCALE


 SQ  5/8/18 08:00


    5/9/18 13:30


 


 


  (Proair Hfa Inh)  2 puff  Q4H  PRN


 INH  5/8/18 01:30


     


 


 


  (Aspirin Chew)  81 mg  DAILY


 CHEW  5/8/18 09:00


    5/10/18 07:23


 


 


  (Lipitor)  40 mg  HS


 PO  5/8/18 21:00


    5/9/18 22:16


 


 


  (Vasotec Inj)  1.25 mg  Q4H  PRN


 IV PUSH  5/8/18 12:00


     


 


 


  (Procardia Xl)  90 mg  DAILY


 PO  5/8/18 13:00


    5/10/18 07:24


 


 


  (Plavix)  75 mg  DAILY


 PO  5/8/18 16:15


    5/10/18 07:24


 


 


  (Duoneb Neb)  1 ampule  Q6HR NEB  PRN


 NEB  5/9/18 10:15


    5/10/18 02:55


 


 


  (Zofran  Odt)  4 mg  Q6H  PRN


 SL  5/9/18 22:45


     


 








Allergies





Allergies


Coded Allergies


  levofloxacin (Verified Allergy, Severe, ITCHING, 5/7/18)





Review of Systems


All other ROS:  ROS reviewed as documented in chart





Exam


I&O / VS





Vital Signs








  Date Time  Temp Pulse Resp B/P (MAP) Pulse Ox O2 Delivery O2 Flow Rate FiO2


 


5/10/18 08:00  84 18 166/89 (114) 97   


 


5/10/18 04:14  92      


 


5/10/18 04:00 97.6 70 14 151/84 (106) 95   


 


5/10/18 02:57     97   


 


5/10/18 00:00 98.0 74 16 147/96 (113) 96   


 


5/9/18 20:00 98.4 74 16 162/95 (117) 99   


 


5/9/18 20:00  76      


 


5/9/18 15:44 98.0 71 20 189/96 (127) 100   


 


5/9/18 12:37  67      


 


5/9/18 11:58 97.9 70 19 180/93 (122) 99   








General:  Alert and Oriented, No acute distress


Eye:  PERRL, EOMI


Respiratory:  Non-labored respirations


Cardiology:  Normal rate


Neurologic:  Alert, Oriented, Normal sensory, Normal motor, No focal defects, 

CN II-XII intact, Normal DTR's


Psychiatric:  Cooperative





Objective


Micro and Labs





Laboratory Tests








Test


  5/10/18


04:50


 


Blood Urea Nitrogen 60 


 


Creatinine 3.88 


 


Random Glucose 110 


 


Calcium Level 8.5 


 


Sodium Level 147 


 


Potassium Level 3.3 


 


Chloride Level 115 


 


Carbon Dioxide Level 21.2 


 


Anion Gap 11 


 


Estimat Glomerular Filtration


Rate 15 


 

















Indra Osei MD May 10, 2018 08:25

## 2018-05-10 NOTE — HHI.NPPN
Subjective


General Problems:  Anemia


Renal Failure:  Chronic, Acute, Stage IV


Interval History


Renal function poor but stable. Wanting to go home. 


 (Kylah Leslie)





Objective Data


Data











 5/10/18 5/11/18





 19:00 07:00


 


Intake Total 273 ml 


 


Balance 273 ml 


 


  


 


Intake IV Total 273 ml 











Vital Signs








  Date Time  Temp Pulse Resp B/P (MAP) Pulse Ox O2 Delivery O2 Flow Rate FiO2


 


5/10/18 09:49     97   21


 


5/10/18 09:07  73      


 


5/10/18 08:00  84 18 166/89 (114) 97   


 


5/10/18 04:14  92      


 


5/10/18 04:00 97.6 70 14 151/84 (106) 95   


 


5/10/18 02:57     97   


 


5/10/18 00:00 98.0 74 16 147/96 (113) 96   


 


5/9/18 20:00 98.4 74 16 162/95 (117) 99   


 


5/9/18 20:00  76      


 


5/9/18 15:44 98.0 71 20 189/96 (127) 100   


 


5/9/18 12:37  67      


 


5/9/18 11:58 97.9 70 19 180/93 (122) 99   








 (Kylah Leslie)


-:  


5/9/18 0805                                                                    

            5/10/18 0450





Imaging





Last 72 hours Impressions








Renal Ultrasound 5/8/18 0000 Signed





Impressions: 





 Service Date/Time:  Tuesday, May 8, 2018 07:41 - CONCLUSION:  1. Mild 





 hydronephrosis. Mild medical renal disease. Bladder unremarkable.     Gabe Brock MD 


 


Brain MRI 5/8/18 0000 Signed





Impressions: 





 Service Date/Time:  Tuesday, May 8, 2018 10:05 - CONCLUSION:  1. Severe 

chronic 





 changes with old infarcts involving the inferior aspect of the left cerebellar 





 hemisphere, superior aspect of the right cerebellar hemisphere, lacunar type 





 infarct in the right side of the tawana and punctate lacunar type infarcts in 

the 





 left basal ganglia. 2. Severe periventricular and scattered deep white matter 





 tract areas of small vessel ischemic demyelination. 3. Nothing acute     Adan Luther MD 


 


Abdomen/Pelvis CT 5/8/18 0000 Signed





Impressions: 





 Service Date/Time:  Tuesday, May 8, 2018 10:25 - CONCLUSION:  1. There is some 





 asymmetric prominence of the left renal pelvis but I do not see rukhsana 





 hydronephrosis. Contrast is seen in both renal collecting systems from the 

prior 





 contrasted CTA of the carotids and intracranial vessels. 2. Enlarged, fibroid 





 uterus. 3. Vicarious excretion of contrast in the gallbladder lumen. 4. 





 Parenchymal cyst in the left lingula/lung base with some pleural parenchymal 





 scarring or atelectasis in the medial right base and left lingula. 5. Possible 





 small, 2.1 cm left ovarian cyst.     Adan Luther MD 


 


Neck CTA 5/7/18 2242 Signed





Impressions: 





 Service Date/Time:  Monday, May 7, 2018 22:49 - CONCLUSION:  Mild 





 atherosclerotic disease bilaterally in the internal carotid bulb. No 





 two-dimensional stenosis is noted.     Delvis Lozada MD 


 


Head CTA 5/7/18 2242 Signed





Impressions: 





 Service Date/Time:  Monday, May 7, 2018 22:49 - CONCLUSION:  Limit exam 

possibly 





 related to motion. I don't see any definite filling defects or obvious 

stenoses 





 but it is a limited exam.     Delvis Lozada MD 








 (Kylah Leslie)





Physical Exam


General


Appearance:  Well Developed, No Acute Distress, Comfortable


 (Kylah Leslie. ERVINP)





Throat


Throat Exam:  Oral Mucosa Pink & Moist


 (Kylah Leslie)





Pulmonary


Resp Exam:  Clear Bilaterally, Breath Sounds Equal


 (Kylah Leslie B. ARNP)





Cardiology


CV Exam:  Regular, Normal Sinus Rhythm


 (Kylah Leslie. ARNP)





Gastrointestinal/Abdomen


GI Exam:  Soft, Non-Tender, Bowel Sounds Present


 (Kylah Leslie)





Musculoskeletal


MS Exam:  Joints Intact, Normal Gait, Normal Tone


 (Kylah Leslie)





Integumentary


Skin Exam:  Clear, Warm, Dry, Intact


 (Kylah Leslie. ARNP)





Extremeties


Extremities Exam:  No Edema, Pedal Pulses Palpable


 (Kylah Leslie BMikhail ONEAL)





Neurologic


Neuro Exam:  Alert, Awake, Oriented, Speech Clear, Moving All Extremities


 (Kylah Leslie. ERVINP)





Psychiatric


Psych Exam:  Appropriate Responses


 (Kylah Leslie)





Assessment/Plan


Discussed Condition With:  Patient


Assessment Summary:  BRONSON/Acute Renal Failure, Anemia of CKD, Proteinuria, 

Hypertension, Diabetes Mellitus, CKD Stage IV


Problem List:  


(1) Acute kidney injury superimposed on chronic kidney disease


ICD Codes:  N17.9 - Acute kidney failure, unspecified; N18.9 - Chronic kidney 

disease, unspecified


Plan:  BRONSON on CKD 4, baseline creatinine 3.4


She has biopsy proven diabetic nephropathy with heavy proteinuria. She is at 

risk for progressive renal decline. 


She will be needing dialysis in the near future. No indication for HD now. Has 

plans for outpatient education. 


She is non oliguric


We will follow in CKD clinic after discharge


Advised to continue antihypertensives at home 








(2) CVA (cerebral infarction)


ICD Codes:  I63.9 - CVA (cerebral infarction)


Status:  Acute


Plan:  vs TIA


Imaging reviewed, symptoms resolved. 





(3) Diabetes mellitus


ICD Codes:  E11.9 - Diabetes mellitus


Status:  Chronic


Plan:  Maintain glucose 140-180mg/dL while hospitalized 





(4) Hypertension


ICD Codes:  I10 - Hypertension


Status:  Acute


Plan:  On nifedipine and hydralazine


On losartan at home 





Plan


Cleared for discharge 


 (Kylah Leslie)


Plan


patient was seen and examined. She is interested in PD. Will see her in our CKD 

clinic after discharge. Stable for discharge now. 


 (Moose Levi MD)





Problem Qualifiers





(1) Diabetes mellitus:  








Kylah Leslie May 10, 2018 10:38


Moose Levi MD May 10, 2018 14:37

## 2018-05-10 NOTE — MB
cc:

Fabricio Jain MD, Arthur W MD

****

 

 

DATE:

05/10/2018

 

HISTORY OF PRESENT ILLNESS:

Mari is a very pleasant 53-year-old lady who presents on 05/07/2018 

with dysarthria. She till has some speech impediment, but does not 

appear to be focal, is anxious to go home. In the ER, she was noted to

be confused.

 

PAST MEDICAL HISTORY:

Includes arthritis of the left knee,  anemia, history of CVA, 

diabetes, gastroparesis, COPD, pneumonia,  left breast abscess status 

post surgery.

 

SOCIAL HISTORY:

Denies tobacco or alcohol use.

 

MEDICATIONS PRIOR TO ADMISSION:

1.  ProAir.

2.  Losartan 50 mg b.i.d.

3.  Nifedipine extended release -90 mg daily.

4.  Atorvastatin 80 mg at bedtime.

5.  Tradjenta

6.  Glipizide.

7.  Zolpidem.

8.  Vitamin C.

9.  Aspirin 81 mg a day

10.  D3 super strength

11.  Omega 3 fish oil.

 

MEDICATIONS IN THE HOSPITAL:

1.  Hydralazine 25 mg q. 12 hrs.

2.  Atorvastatin 40 mg at bedtime.

3.  Albuterol p.r.n.

4.  Clopidogrel 75 mg daily.

5.  Nifedipine 90 mg daily.

6.  Aspirin 81 mg daily.

7. heparin 5000 subcu q 12 hrs.

 

PHYSICAL EXAMINATION:

VITAL SIGNS:  Blood pressure 183/93, pulse 77, temperature 97.6, sat 

is 98% on room air.

GENERAL:  She is alert and oriented x 3, in no acute distress.

NECK:  Supple.  No JVD.  No bruit.

CARDIOVASCULAR:  S1, S2.  No murmurs, rubs or gallops.

LUNGS:  Clear to auscultation bilaterally.

ABDOMEN:  Soft, nontender, nondistended with positive bowel sounds.

EXTREMITIES:  No lower extremity edema.

 

IMAGING STUDIES:

Head CT on 05/07/2018:  Multiple old strokes including the cerebellar 

hemispheres.  No evidence of hemorrhage or edema.  Neck CTA:  Mild 

atherosclerotic disease bilaterally in the internal carotid bulb.  No 

2-dimensional stenosis is noted.  Head CTA:  Limited exam possibly 

related to motion.  I do not see any definite filling defects or 

obvious stenoses, but it is a "limited exam". Renal ultrasound: Mild 

hydronephrosis.  Brain MRI 05/08/2018:  Severe chronic changes with 

old infarcts involving the inferior aspect of the left cerebellar 

hemisphere, superior aspect of the right cerebellar hemisphere, 

lacunar type infarct in the right side of the tawana and punctate 

lacunar type infarcts in the left basal ganglia, severe 

periventricular and scattered deep white matter tract areas of small 

vessel ischemic demyelination, nothing acute.  Abdominal pelvic CT:  

Some asymmetric prominence of the left renal pelvis, but I do not see 

rukhsana hydronephrosis.  Contrast is seen in both renal collecting 

systems from the prior contrast of CT of the carotids and intracranial

vessels.  Large fibroid uterus, vicarious excretion of contrast in the

gallbladder lumen, parenchymal cyst in the left lingula lung base with

some pleural parenchymal scarring or atelectasis in the medial right 

base in the left lingula, possible small 2.1 cm left ovarian cyst .

 

CARDIOLOGY STUDIES:

EKG:  Normal sinus rhythm at 76 beats per minute, PVCs.

 

LABORATORY DATA:

White count 4.9, hemoglobin 9.3, hematocrit 28.5, platelet count 225. 

INR 1.0.  Sodium 147, potassium 3.3, chloride 115, BUN 60, creatinine 

3.88, LDL 51.  Albumin 2.6, INR 1.0.  Toxicology is negative.

 

DIAGNOSES:

1.  History of cerebrovascular accident.

2.  Hypertension.

3.  Transient ischemic attack.

4.  Carotid stenosis.

5.  Diabetes mellitus.

6.  Hypertension.

7.  Acute renal failure.

 

DISCUSSION:

At this point in time, we will increase her hydralazine to 25 mg q 12 

hours.   Suspect a lot of her hypertension is related to acute renal 

failure.  She is on aspirin and Plavix and is otherwise asymptomatic. 

Ejection fraction is 55%.

 

 

__________________________________

Fabricio Jain MD

 

 

KAILASH/SA

D: 05/10/2018, 08:17 PM

T: 05/10/2018, 09:23 PM

Visit #: [id number]

Job #: [id number]

## 2018-05-10 NOTE — HHI.PR
Subjective


Remarks


in no acute distress.


no new complaints.


wants to go home today.





Objective


Vitals





Vital Signs








  Date Time  Temp Pulse Resp B/P (MAP) Pulse Ox O2 Delivery O2 Flow Rate FiO2


 


5/10/18 08:00  84 18 166/89 (114) 97   


 


5/10/18 04:14  92      


 


5/10/18 04:00 97.6 70 14 151/84 (106) 95   


 


5/10/18 02:57     97   


 


5/10/18 00:00 98.0 74 16 147/96 (113) 96   


 


5/9/18 20:00 98.4 74 16 162/95 (117) 99   


 


5/9/18 20:00  76      


 


5/9/18 15:44 98.0 71 20 189/96 (127) 100   


 


5/9/18 12:37  67      


 


5/9/18 11:58 97.9 70 19 180/93 (122) 99   














I/O      


 


 5/9/18 5/9/18 5/9/18 5/10/18 5/10/18 5/10/18





 07:00 15:00 23:00 07:00 15:00 23:00


 


Intake Total 1000 ml   989 ml  


 


Balance 1000 ml   989 ml  


 


      


 


Intake IV Total 1000 ml   989 ml  


 


# Voids 5     


 


# Bowel Movements 1     








Result Diagram:  


5/9/18 0805                                                                    

            5/10/18 0450





Imaging





Last Impressions








Renal Ultrasound 5/8/18 0000 Signed





Impressions: 





 Service Date/Time:  Tuesday, May 8, 2018 07:41 - CONCLUSION:  1. Mild 





 hydronephrosis. Mild medical renal disease. Bladder unremarkable.     Gabe Brock MD 


 


Brain MRI 5/8/18 0000 Signed





Impressions: 





 Service Date/Time:  Tuesday, May 8, 2018 10:05 - CONCLUSION:  1. Severe 

chronic 





 changes with old infarcts involving the inferior aspect of the left cerebellar 





 hemisphere, superior aspect of the right cerebellar hemisphere, lacunar type 





 infarct in the right side of the tawana and punctate lacunar type infarcts in 

the 





 left basal ganglia. 2. Severe periventricular and scattered deep white matter 





 tract areas of small vessel ischemic demyelination. 3. Nothing acute     Adan Luther MD 


 


Abdomen/Pelvis CT 5/8/18 0000 Signed





Impressions: 





 Service Date/Time:  Tuesday, May 8, 2018 10:25 - CONCLUSION:  1. There is some 





 asymmetric prominence of the left renal pelvis but I do not see rukhsana 





 hydronephrosis. Contrast is seen in both renal collecting systems from the 

prior 





 contrasted CTA of the carotids and intracranial vessels. 2. Enlarged, fibroid 





 uterus. 3. Vicarious excretion of contrast in the gallbladder lumen. 4. 





 Parenchymal cyst in the left lingula/lung base with some pleural parenchymal 





 scarring or atelectasis in the medial right base and left lingula. 5. Possible 





 small, 2.1 cm left ovarian cyst.     Adan Luther MD 


 


Neck CTA 5/7/18 2242 Signed





Impressions: 





 Service Date/Time:  Monday, May 7, 2018 22:49 - CONCLUSION:  Mild 





 atherosclerotic disease bilaterally in the internal carotid bulb. No 





 two-dimensional stenosis is noted.     Delvis Lozada MD 


 


Head CTA 5/7/18 2242 Signed





Impressions: 





 Service Date/Time:  Monday, May 7, 2018 22:49 - CONCLUSION:  Limit exam 

possibly 





 related to motion. I don't see any definite filling defects or obvious 

stenoses 





 but it is a limited exam.     Delvis Lozada MD 


 


Head CT 5/7/18 0000 Signed





Impressions: 





 Service Date/Time:  Monday, May 7, 2018 22:47 - CONCLUSION:  Multiple old 





 strokes including the cerebellar hemispheres. No evidence of hemorrhage or 





 edema.  This report was called to Dr. Chacon at <2259 hrs.>.       Delvis Lozada MD 








Objective Remarks


GENERAL: This is a well-nourished, well-developed patient, in no apparent 

distress.


CARDIOVASCULAR: Regular rate and regular rhythm without murmurs, gallops, or 

rubs. 


RESPIRATORY: Clear to auscultation. Breath sounds equal bilaterally. No wheezes

, rales, or rhonchi.  


GASTROINTESTINAL: Abdomen soft, non-tender, nondistended. 


Normal, active bowel sounds


MUSCULOSKELETAL: Extremities without clubbing, cyanosis, or edema.


NEURO:  Alert & Oriented x4 to person, place, time, situation.  Moves all ext x4


Medications and IVs





Inpatient Medications


Albuterol Sulfate (Proair Hfa Inh) 2 puff Q4H  PRN INH SHORTNESS OF BREATH;  

Start 5/8/18 at 01:30


Albuterol/ Ipratropium (Duoneb Neb) 1 ampule Q6HR NEB  PRN NEB SHORTNESS OF 

BREATH Last administered on 5/10/18at 02:55;  Start 5/9/18 at 10:15


Aspirin (Aspirin Chew) 81 mg DAILY CHEW  Last administered on 5/10/18at 07:23;  

Start 5/8/18 at 09:00


Atorvastatin Calcium (Lipitor) 40 mg HS PO  Last administered on 5/9/18at 22:16

;  Start 5/8/18 at 21:00


Bisacodyl (Dulcolax Supp) 10 mg DAILY  PRN RECTAL SEVERE CONSITIPATION;  Start 5 /8/18 at 00:15


Clopidogrel Bisulfate (Plavix) 75 mg DAILY PO  Last administered on 5/10/18at 07

:24;  Start 5/8/18 at 16:15


Enalaprilat (Vasotec Inj) 1.25 mg Q4H  PRN IV PUSH For SBP > 220 or DBP > 120;  

Start 5/8/18 at 12:00


Heparin Sodium (Porcine) (Heparin Inj) 5,000 units Q12H SQ  Last administered 

on 5/10/18at 07:24;  Start 5/8/18 at 09:00


Insulin Aspart (NovoLOG SUPPLEMENTAL SCALE) 1 ACHS SLIDING  SCALE SQ  Last 

administered on 5/9/18at 13:30;  Start 5/8/18 at 08:00


Lactulose (Lactulose Liq) 30 ml DAILY  PRN PO SEVERE CONSITIPATION;  Start 5/8/ 18 at 00:15


Magnesium Hydroxide (Milk Of Magnesia Liq) 30 ml Q12H  PRN PO Mild constipation

;  Start 5/8/18 at 00:15


Naloxone HCl (Narcan Inj) 0.4 mg UNSCH  PRN IV PUSH SEE LABEL COMMENTS;  Start 5 /8/18 at 00:15


Nifedipine (Procardia Xl) 90 mg DAILY PO  Last administered on 5/10/18at 07:24;

  Start 5/8/18 at 13:00


Ondansetron HCl (Zofran  Odt) 4 mg Q6H  PRN SL NAUSEA OR VOMITING;  Start 5/9/ 18 at 22:45


Ondansetron HCl (Zofran Inj) 4 mg Q6H  PRN IVP NAUSEA OR VOMITING;  Start 5/8/ 18 at 00:15;  Stop 5/9/18 at 22:44;  Status DC


Sennosides (Senokot) 17.2 mg Q12H  PRN PO Moderate constipation;  Start 5/8/18 

at 00:15


Sodium Chloride (NS Flush) 2 ml BID IV FLUSH  Last administered on 5/9/18at 22:

18;  Start 5/8/18 at 09:00





A/P


Assessment and Plan


A/P  





possible TIA


 Imaging negative for acute stroke, however chronic strokes noted.


Ultrasound carotids negative for stenosis.


sinus rhythm on telemetry.


echo with EF 55% , mild LVH and patent foramen ovale


continue aspirin, plavix and statin.


neurology following.


will consult cardiology.


PT consulted.





COPD.  Respiratory status stable.  Continue home meds.





BRONSON on CKD4.


CT of the abdomen with no hydronephrosis.


nephrology following- might need HD in near future and the patient is aware- f/

u as outpatient.





Diabetes mellitus.  Chronic.  Diabetic diet.  Insulin sliding scale.





Hypertension. continue Nifedipine- will  add hydralazine.





DVT prophylaxis with subq Heparin.


Discharge Planning


awaiting cardiology consult.


d/w the patient regarding the echo findings .











Frankie Foreman MD May 10, 2018 08:56

## 2018-05-11 VITALS
RESPIRATION RATE: 18 BRPM | SYSTOLIC BLOOD PRESSURE: 160 MMHG | DIASTOLIC BLOOD PRESSURE: 88 MMHG | TEMPERATURE: 97.3 F | HEART RATE: 76 BPM | OXYGEN SATURATION: 98 %

## 2018-05-11 VITALS
DIASTOLIC BLOOD PRESSURE: 92 MMHG | RESPIRATION RATE: 18 BRPM | OXYGEN SATURATION: 99 % | SYSTOLIC BLOOD PRESSURE: 153 MMHG | TEMPERATURE: 97.7 F | HEART RATE: 71 BPM

## 2018-05-11 VITALS
OXYGEN SATURATION: 98 % | HEART RATE: 65 BPM | RESPIRATION RATE: 18 BRPM | DIASTOLIC BLOOD PRESSURE: 88 MMHG | TEMPERATURE: 98.1 F | SYSTOLIC BLOOD PRESSURE: 158 MMHG

## 2018-05-11 VITALS
TEMPERATURE: 98.6 F | RESPIRATION RATE: 18 BRPM | DIASTOLIC BLOOD PRESSURE: 98 MMHG | SYSTOLIC BLOOD PRESSURE: 157 MMHG | HEART RATE: 81 BPM | OXYGEN SATURATION: 96 %

## 2018-05-11 RX ADMIN — ASPIRIN 81 MG SCH MG: 81 TABLET ORAL at 09:08

## 2018-05-11 RX ADMIN — INSULIN ASPART SCH: 100 INJECTION, SOLUTION INTRAVENOUS; SUBCUTANEOUS at 12:00

## 2018-05-11 RX ADMIN — NIFEDIPINE SCH MG: 90 TABLET, FILM COATED, EXTENDED RELEASE ORAL at 09:07

## 2018-05-11 RX ADMIN — Medication SCH ML: at 09:08

## 2018-05-11 RX ADMIN — HEPARIN SODIUM SCH UNITS: 10000 INJECTION, SOLUTION INTRAVENOUS; SUBCUTANEOUS at 09:09

## 2018-05-11 RX ADMIN — INSULIN ASPART SCH: 100 INJECTION, SOLUTION INTRAVENOUS; SUBCUTANEOUS at 08:00

## 2018-05-11 RX ADMIN — CLOPIDOGREL BISULFATE SCH MG: 75 TABLET, FILM COATED ORAL at 09:07

## 2018-05-11 NOTE — HHI.DS
__________________________________________________





Discharge Summary


Admission Date


May 8, 2018 at 00:08


Discharge Date:  May 11, 2018


Admitting Diagnosis





Stroke, BRONSON





(1) CVA (cerebral infarction)


ICD Code:  I63.9 - CVA (cerebral infarction)


Diagnosis:  Principal


Status:  Acute


(2) Acute kidney injury superimposed on chronic kidney disease


ICD Code:  N17.9 - Acute kidney failure, unspecified; N18.9 - Chronic kidney 

disease, unspecified


Diagnosis:  Principal





Procedures


none


Brief History - From Admission


53-year-old female who presents brought in by family due to slurred speech 

starting possibly around 10 PM.  She says she does not know why she was brought 

into the hospital.  She is somnolent, wakes her exam, is actually oriented 3.  

She denies any changes in medications.  Denies any pain.  Denies any chest pain 

or shortness of breath.  She says she is tired I would like to sleep.


CBC/BMP:  


5/9/18 0805                                                                    

            5/10/18 0450





Significant Findings





Laboratory Tests








Test


  5/8/18


11:45 5/9/18


08:05 5/10/18


04:50


 


Blood Urea Nitrogen


  66 MG/DL


(7-18) 67 MG/DL


(7-18) 60 MG/DL


(7-18)


 


Creatinine


  3.83 MG/DL


(0.50-1.00) 3.91 MG/DL


(0.50-1.00) 3.88 MG/DL


(0.50-1.00)


 


Random Glucose


  109 MG/DL


() 121 MG/DL


() 110 MG/DL


()


 


Albumin


  2.8 GM/DL


(3.4-5.0) 2.6 GM/DL


(3.4-5.0) 


 


 


Sodium Level


  146 MEQ/L


(136-145) 146 MEQ/L


(136-145) 147 MEQ/L


(136-145)


 


Chloride Level


  114 MEQ/L


() 114 MEQ/L


() 115 MEQ/L


()


 


Estimat Glomerular Filtration


Rate 15 ML/MIN


(>89) 15 ML/MIN


(>89) 15 ML/MIN


(>89)


 


Red Blood Count


  


  3.51 MIL/MM3


(4.00-5.30) 


 


 


Hemoglobin


  


  9.3 GM/DL


(11.6-15.3) 


 


 


Hematocrit


  


  28.5 %


(35.0-46.0) 


 


 


Mean Corpuscular Hemoglobin


  


  26.5 PG


(27.0-34.0) 


 


 


Monocytes (%) (Auto)


  


  9.7 %


(0.0-8.0) 


 


 


Eosinophils (%) (Auto)


  


  10.5 %


(0.0-4.0) 


 


 


Eosinophils # (Auto)


  


  0.5 TH/MM3


(0-0.4) 


 


 


Total Protein


  


  6.2 GM/DL


(6.4-8.2) 


 


 


Phosphorus Level


  


  5.1 MG/DL


(2.5-4.9) 


 


 


HDL Cholesterol


  


  74.5 MG/DL


(40.0-60.0) 


 


 


Potassium Level


  


  


  3.3 MEQ/L


(3.5-5.1)








Imaging





Last Impressions








Renal Ultrasound 5/8/18 0000 Signed





Impressions: 





 Service Date/Time:  Tuesday, May 8, 2018 07:41 - CONCLUSION:  1. Mild 





 hydronephrosis. Mild medical renal disease. Bladder unremarkable.     Gabe Brock MD 


 


Brain MRI 5/8/18 0000 Signed





Impressions: 





 Service Date/Time:  Tuesday, May 8, 2018 10:05 - CONCLUSION:  1. Severe 

chronic 





 changes with old infarcts involving the inferior aspect of the left cerebellar 





 hemisphere, superior aspect of the right cerebellar hemisphere, lacunar type 





 infarct in the right side of the tawana and punctate lacunar type infarcts in 

the 





 left basal ganglia. 2. Severe periventricular and scattered deep white matter 





 tract areas of small vessel ischemic demyelination. 3. Nothing acute     Adan Luther MD 


 


Abdomen/Pelvis CT 5/8/18 0000 Signed





Impressions: 





 Service Date/Time:  Tuesday, May 8, 2018 10:25 - CONCLUSION:  1. There is some 





 asymmetric prominence of the left renal pelvis but I do not see rukhsana 





 hydronephrosis. Contrast is seen in both renal collecting systems from the 

prior 





 contrasted CTA of the carotids and intracranial vessels. 2. Enlarged, fibroid 





 uterus. 3. Vicarious excretion of contrast in the gallbladder lumen. 4. 





 Parenchymal cyst in the left lingula/lung base with some pleural parenchymal 





 scarring or atelectasis in the medial right base and left lingula. 5. Possible 





 small, 2.1 cm left ovarian cyst.     Adan Luther MD 


 


Neck CTA 5/7/18 2242 Signed





Impressions: 





 Service Date/Time:  Monday, May 7, 2018 22:49 - CONCLUSION:  Mild 





 atherosclerotic disease bilaterally in the internal carotid bulb. No 





 two-dimensional stenosis is noted.     Delvis Lozada MD 


 


Head CTA 5/7/18 2242 Signed





Impressions: 





 Service Date/Time:  Monday, May 7, 2018 22:49 - CONCLUSION:  Limit exam 

possibly 





 related to motion. I don't see any definite filling defects or obvious 

stenoses 





 but it is a limited exam.     Delvis Lozada MD 


 


Head CT 5/7/18 0000 Signed





Impressions: 





 Service Date/Time:  Monday, May 7, 2018 22:47 - CONCLUSION:  Multiple old 





 strokes including the cerebellar hemispheres. No evidence of hemorrhage or 





 edema.  This report was called to Dr. Chacon at <2259 hrs.>.       Delvis Lozada MD 








PE at Discharge


GENERAL: This is a well-nourished, well-developed patient, in no apparent 

distress.


CARDIOVASCULAR: Regular rate and regular rhythm without murmurs, gallops, or 

rubs. 


RESPIRATORY: Clear to auscultation. Breath sounds equal bilaterally. No wheezes

, rales, or rhonchi.  


GASTROINTESTINAL: Abdomen soft, non-tender, nondistended. 


Normal, active bowel sounds


MUSCULOSKELETAL: Extremities without clubbing, cyanosis, or edema.


NEURO:  Alert & Oriented x4 to person, place, time, situation.  Moves all ext x4


Hospital Course


possible TIA


 Imaging negative for acute stroke, however chronic strokes noted.


Ultrasound carotids negative for stenosis.


sinus rhythm on telemetry.


echo with EF 55% , mild LVH and patent foramen ovale


continue aspirin, plavix and statin.


neurology following.


cardiology consulted.


PT consulted.





COPD.  Respiratory status stable.  Continue home meds.





BRONSON on CKD4.


CT of the abdomen with no hydronephrosis.


nephrology following- might need HD in near future and the patient is aware- f/

u as outpatient.





Diabetes mellitus.  Chronic.  Diabetic diet.  Insulin sliding scale.





Hypertension. continue Nifedipine-  added hydralazine.


Pt Condition on Discharge:  Fair


Discharge Disposition:  Discharge Home


Discharge Time:  <= 30 minutes


Discharge Instructions


DIET: Follow Instructions for:  Heart Healthy Diet, Diabetic Diet


Activities you can perform:  Regular-No Restrictions











Frankie Foreman MD May 11, 2018 11:41

## 2018-05-11 NOTE — HHI.NPPN
Subjective


General Problems:  Anemia


Renal Failure:  Chronic, Acute, Stage IV


Interval History


Echo showed PFO. Cardiology did evaluate. 


Labs were not obtained today. She is making urine. BP control is better. 


 (Kylah Leslie)





Objective Data


Data











 5/11/18 5/12/18





 18:59 06:59


 


  


 


# Bowel Movements 1 











Vital Signs








  Date Time  Temp Pulse Resp B/P (MAP) Pulse Ox O2 Delivery O2 Flow Rate FiO2


 


5/11/18 12:00 97.7 71 18 153/92 (112) 99   


 


5/11/18 08:00 98.6 81 18 157/98 (117) 96   


 


5/11/18 04:00  76      


 


5/11/18 04:00 97.3 76 18 160/88 (112) 98   


 


5/11/18 00:00  65      


 


5/11/18 00:00 98.1 74 18 158/88 (111) 98   


 


5/10/18 20:00 97.9 75 18 152/79 (103) 99   


 


5/10/18 20:00  76      


 


5/10/18 16:24  73      


 


5/10/18 16:00  77 18 183/93 (123) 98   








 (Kylah Leslie)


-:  


5/9/18 0805                                                                    

            5/10/18 0450








Physical Exam


General


Appearance:  Well Developed, No Acute Distress, Comfortable


 (Kylah Leslie)





Throat


Throat Exam:  Oral Mucosa Pink & Moist


 (Kylah Leslie)





Pulmonary


Resp Exam:  Clear Bilaterally, Breath Sounds Equal


 (Kylah Leslie)





Cardiology


CV Exam:  Regular, Normal Sinus Rhythm


 (Kylah Leslie)





Gastrointestinal/Abdomen


GI Exam:  Soft, Non-Tender, Bowel Sounds Present


 (Kylah Leslie)





Musculoskeletal


MS Exam:  Joints Intact, Normal Gait, Normal Tone


 (Kylah Leslie)





Integumentary


Skin Exam:  Clear, Warm, Dry, Intact


 (Kylah Leslie)





Extremeties


Extremities Exam:  No Edema, Pedal Pulses Palpable


 (Kylah Leslie)





Neurologic


Neuro Exam:  Alert, Awake, Oriented, Speech Clear, Moving All Extremities


 (Kylah Leslie)





Psychiatric


Psych Exam:  Appropriate Responses


 (Kyalh Leslie)





Assessment/Plan


Discussed Condition With:  Patient


Assessment Summary:  BRONSON/Acute Renal Failure, Anemia of CKD, Proteinuria, 

Hypertension, Diabetes Mellitus, CKD Stage IV


Problem List:  


(1) Acute kidney injury superimposed on chronic kidney disease


ICD Codes:  N17.9 - Acute kidney failure, unspecified; N18.9 - Chronic kidney 

disease, unspecified


Plan:  BRONSON on CKD 4, baseline creatinine 3.4


She has biopsy proven diabetic nephropathy with heavy proteinuria. She is at 

risk for progressive renal decline. 


Eventually she will require dialysis, however there is no indication at this 

time. 


Stable for discharge. We will follow in CKD clinic and we have arranged 

outpatient education next week to discuss the different modalities.


She is currently non oliguric


Advised to continue antihypertensives at home , resume losartan. 


Stay well hydrated, avoid NSAIDs and PPI, limit excess protein consumption in 

her diet. 





(2) CVA (cerebral infarction)


ICD Codes:  I63.9 - CVA (cerebral infarction)


Status:  Acute


Plan:  vs TIA


Imaging reviewed, symptoms resolved. 





(3) Diabetes mellitus


ICD Codes:  E11.9 - Diabetes mellitus


Status:  Chronic


Plan:  Maintain glucose 140-180mg/dL while hospitalized 





(4) Hypertension


ICD Codes:  I10 - Hypertension


Status:  Acute


Plan:  On nifedipine and hydralazine


To resume losartan once  home 


Low Na diet and exercise discussed 





(5) PFO (patent foramen ovale)


ICD Codes:  Q21.1 - Atrial septal defect


Plan:  Discovered on Echo.


She has a right to left shunt, was evaluated by cardiology. 


Would like to follow with her cardiologist after discharge  





Plan


 


 (Kylah Leslie)


Plan


patient was seen and examined. Agree with above assessment and plan. 


 (Moose Levi MD)





Problem Qualifiers





(1) Diabetes mellitus:  








Kylah Leslie May 11, 2018 14:35


Moose Levi MD May 11, 2018 15:02

## 2018-05-11 NOTE — HHI.PR
Subjective


Remarks


in no acute distress.


no slurred speech or focal weakness.


no new complaints.





Objective


Vitals





Vital Signs








  Date Time  Temp Pulse Resp B/P (MAP) Pulse Ox O2 Delivery O2 Flow Rate FiO2


 


5/11/18 08:00 98.6 81 18 157/98 (117) 96   


 


5/11/18 04:00  76      


 


5/11/18 04:00 97.3 76 18 160/88 (112) 98   


 


5/11/18 00:00  65      


 


5/11/18 00:00 98.1 74 18 158/88 (111) 98   


 


5/10/18 20:00 97.9 75 18 152/79 (103) 99   


 


5/10/18 20:00  76      


 


5/10/18 16:24  73      


 


5/10/18 16:00  77 18 183/93 (123) 98   


 


5/10/18 12:18  78      


 


5/10/18 09:49     97   21














I/O      


 


 5/10/18 5/10/18 5/10/18 5/11/18 5/11/18 5/11/18





 07:00 15:00 23:00 07:00 15:00 23:00


 


Intake Total 989 ml 273 ml    


 


Balance 989 ml 273 ml    


 


      


 


Intake IV Total 989 ml 273 ml    








Result Diagram:  


5/9/18 0805                                                                    

            5/10/18 0450





Imaging





Last Impressions








Renal Ultrasound 5/8/18 0000 Signed





Impressions: 





 Service Date/Time:  Tuesday, May 8, 2018 07:41 - CONCLUSION:  1. Mild 





 hydronephrosis. Mild medical renal disease. Bladder unremarkable.     Gabe Brock MD 


 


Brain MRI 5/8/18 0000 Signed





Impressions: 





 Service Date/Time:  Tuesday, May 8, 2018 10:05 - CONCLUSION:  1. Severe 

chronic 





 changes with old infarcts involving the inferior aspect of the left cerebellar 





 hemisphere, superior aspect of the right cerebellar hemisphere, lacunar type 





 infarct in the right side of the tawana and punctate lacunar type infarcts in 

the 





 left basal ganglia. 2. Severe periventricular and scattered deep white matter 





 tract areas of small vessel ischemic demyelination. 3. Nothing acute     Adan Luther MD 


 


Abdomen/Pelvis CT 5/8/18 0000 Signed





Impressions: 





 Service Date/Time:  Tuesday, May 8, 2018 10:25 - CONCLUSION:  1. There is some 





 asymmetric prominence of the left renal pelvis but I do not see rukhsana 





 hydronephrosis. Contrast is seen in both renal collecting systems from the 

prior 





 contrasted CTA of the carotids and intracranial vessels. 2. Enlarged, fibroid 





 uterus. 3. Vicarious excretion of contrast in the gallbladder lumen. 4. 





 Parenchymal cyst in the left lingula/lung base with some pleural parenchymal 





 scarring or atelectasis in the medial right base and left lingula. 5. Possible 





 small, 2.1 cm left ovarian cyst.     Adan Luther MD 


 


Neck CTA 5/7/18 2242 Signed





Impressions: 





 Service Date/Time:  Monday, May 7, 2018 22:49 - CONCLUSION:  Mild 





 atherosclerotic disease bilaterally in the internal carotid bulb. No 





 two-dimensional stenosis is noted.     Delvis Lozada MD 


 


Head CTA 5/7/18 2242 Signed





Impressions: 





 Service Date/Time:  Monday, May 7, 2018 22:49 - CONCLUSION:  Limit exam 

possibly 





 related to motion. I don't see any definite filling defects or obvious 

stenoses 





 but it is a limited exam.     Delvis Lozada MD 


 


Head CT 5/7/18 0000 Signed





Impressions: 





 Service Date/Time:  Monday, May 7, 2018 22:47 - CONCLUSION:  Multiple old 





 strokes including the cerebellar hemispheres. No evidence of hemorrhage or 





 edema.  This report was called to Dr. Chacon at <2259 hrs.>.       Delvis Lozada MD 








Objective Remarks


GENERAL: This is a well-nourished, well-developed patient, in no apparent 

distress.


CARDIOVASCULAR: Regular rate and regular rhythm without murmurs, gallops, or 

rubs. 


RESPIRATORY: Clear to auscultation. Breath sounds equal bilaterally. No wheezes

, rales, or rhonchi.  


GASTROINTESTINAL: Abdomen soft, non-tender, nondistended. 


Normal, active bowel sounds


MUSCULOSKELETAL: Extremities without clubbing, cyanosis, or edema.


NEURO:  Alert & Oriented x4 to person, place, time, situation.  Moves all ext x4


Procedures


none


Medications and IVs





Inpatient Medications


Albuterol Sulfate (Proair Hfa Inh) 2 puff Q4H  PRN INH SHORTNESS OF BREATH;  

Start 5/8/18 at 01:30


Albuterol/ Ipratropium (Duoneb Neb) 1 ampule Q6HR NEB  PRN NEB SHORTNESS OF 

BREATH Last administered on 5/10/18at 13:33;  Start 5/9/18 at 10:15


Aspirin (Aspirin Chew) 81 mg DAILY CHEW  Last administered on 5/10/18at 07:23;  

Start 5/8/18 at 09:00


Atorvastatin Calcium (Lipitor) 40 mg HS PO  Last administered on 5/10/18at 21:31

;  Start 5/8/18 at 21:00


Bisacodyl (Dulcolax Supp) 10 mg DAILY  PRN RECTAL SEVERE CONSITIPATION;  Start 5 /8/18 at 00:15


Clopidogrel Bisulfate (Plavix) 75 mg DAILY PO  Last administered on 5/10/18at 07

:24;  Start 5/8/18 at 16:15


Enalaprilat (Vasotec Inj) 1.25 mg Q4H  PRN IV PUSH For SBP > 180 or DBP > 100 

Last administered on 5/10/18at 16:49;  Start 5/8/18 at 12:00


Heparin Sodium (Porcine) (Heparin Inj) 5,000 units Q12H SQ  Last administered 

on 5/10/18at 21:32;  Start 5/8/18 at 09:00


Hydralazine HCl (Apresoline) 25 mg Q8HR PO  Last administered on 5/11/18at 05:09

;  Start 5/10/18 at 22:00


Insulin Aspart (NovoLOG SUPPLEMENTAL SCALE) 1 ACHS SLIDING  SCALE SQ  Last 

administered on 5/10/18at 21:53;  Start 5/8/18 at 08:00


Lactulose (Lactulose Liq) 30 ml DAILY  PRN PO SEVERE CONSITIPATION;  Start 5/8/ 18 at 00:15


Magnesium Hydroxide (Milk Of Magnesia Liq) 30 ml Q12H  PRN PO Mild constipation

;  Start 5/8/18 at 00:15


Naloxone HCl (Narcan Inj) 0.4 mg UNSCH  PRN IV PUSH SEE LABEL COMMENTS;  Start 5 /8/18 at 00:15


Nifedipine (Procardia Xl) 90 mg DAILY PO  Last administered on 5/10/18at 07:24;

  Start 5/8/18 at 13:00


Ondansetron HCl (Zofran  Odt) 4 mg Q6H  PRN SL NAUSEA OR VOMITING;  Start 5/9/ 18 at 22:45


Ondansetron HCl (Zofran Inj) 4 mg Q6H  PRN IVP NAUSEA OR VOMITING;  Start 5/8/ 18 at 00:15;  Stop 5/9/18 at 22:44;  Status DC


Potassium Chloride (KCl) 20 meq ONCE  ONCE PO  Last administered on 5/10/18at 10

:09;  Start 5/10/18 at 10:00;  Stop 5/10/18 at 10:01;  Status DC


Sennosides (Senokot) 17.2 mg Q12H  PRN PO Moderate constipation;  Start 5/8/18 

at 00:15


Sodium Chloride (NS Flush) 2 ml BID IV FLUSH  Last administered on 5/10/18at 21:

32;  Start 5/8/18 at 09:00





A/P


Assessment and Plan


A/P  





possible TIA


 Imaging negative for acute stroke, however chronic strokes noted.


Ultrasound carotids negative for stenosis.


sinus rhythm on telemetry.


echo with EF 55% , mild LVH and patent foramen ovale


continue aspirin, plavix and statin.


neurology following.


cardiology consulted; d/w  today who would see the patient today.


PT consulted.





COPD.  Respiratory status stable.  Continue home meds.





BRONSON on CKD4.


CT of the abdomen with no hydronephrosis.


nephrology following- might need HD in near future and the patient is aware- f/

u as outpatient.





Diabetes mellitus.  Chronic.  Diabetic diet.  Insulin sliding scale.





Hypertension. continue Nifedipine- will  add hydralazine.





DVT prophylaxis with subq Heparin.


Discharge Planning


awaiting cardiology evaluation by .


dc home when cleared by cardiology and neurology.


see med list.


d/w the patient and cardiology.











Frankie Foreman MD May 11, 2018 09:16

## 2018-05-11 NOTE — HHI.PR
Review/Management


Diagnosis/Plan:  


(1) Slurred speech


ICD Codes:  R47.81 - Slurred speech


Status:  Resolved


Plan:  no longer having slurred speech. possible exacerbation of previous stroke


CT did not show acute event


CTA head/neck unremarkable


check MRI brain- old strokes. moderate white matter dz. likely 2/2 chronic 

uncontrolled htn








recs


seen by cardiology- ? pfo not addressed. closure vs medical management. has had 

previous strokes. closure not unreasonable, although shunt is left to right. 

may need harpal- defer to cardiology





bp control





neuro stable


aspirin and plavix





on statin


bp goal <120/80


d/c planning from neuro





(2) Hypertension


ICD Codes:  I10 - Hypertension


Status:  Acute


Plan:  elevated BP on admission, will continue to monitor 





pt aware of importance of controlling for secondary stroke risk factors as outpt





(3) Dyslipidemia


ICD Codes:  E78.5 - Dyslipidemia


Status:  Acute


Plan:  lipid panel pending


goal LDL 70





(4) Acute renal failure superimposed on stage 3 chronic kidney disease


ICD Codes:  N17.9 - Acute kidney failure, unspecified; N18.3 - Chronic kidney 

disease, stage 3 (moderate)


Plan:  CR 4.4








Subjective


Subjective Comments


No acute events reported


No headache


No chest pain


No dyspnea


Active Medications





Current Medications








 Medications


  (Trade)  Dose


 Ordered  Sig/Danielle


 Route  Start Time


 Stop Time Status Last Admin


 


  (NS Flush)  2 ml  UNSCH  PRN


 IV FLUSH  5/8/18 00:15


     


 


 


  (NS Flush)  2 ml  BID


 IV FLUSH  5/8/18 09:00


    5/10/18 21:32


 


 


  (Heparin Inj)  5,000 units  Q12H


 SQ  5/8/18 09:00


    5/10/18 21:32


 


 


  (Narcan Inj)  0.4 mg  UNSCH  PRN


 IV PUSH  5/8/18 00:15


     


 


 


  (Milk Of


 Magnesia Liq)  30 ml  Q12H  PRN


 PO  5/8/18 00:15


     


 


 


  (Senokot)  17.2 mg  Q12H  PRN


 PO  5/8/18 00:15


     


 


 


  (Dulcolax Supp)  10 mg  DAILY  PRN


 RECTAL  5/8/18 00:15


     


 


 


  (Lactulose Liq)  30 ml  DAILY  PRN


 PO  5/8/18 00:15


     


 


 


  (NovoLOG


 SUPPLEMENTAL


 SCALE)  1  ACHS SLIDING  SCALE


 SQ  5/8/18 08:00


    5/10/18 21:53


 


 


  (Proair Hfa Inh)  2 puff  Q4H  PRN


 INH  5/8/18 01:30


     


 


 


  (Aspirin Chew)  81 mg  DAILY


 CHEW  5/8/18 09:00


    5/10/18 07:23


 


 


  (Lipitor)  40 mg  HS


 PO  5/8/18 21:00


    5/10/18 21:31


 


 


  (Vasotec Inj)  1.25 mg  Q4H  PRN


 IV PUSH  5/8/18 12:00


    5/10/18 16:49


 


 


  (Procardia Xl)  90 mg  DAILY


 PO  5/8/18 13:00


    5/10/18 07:24


 


 


  (Plavix)  75 mg  DAILY


 PO  5/8/18 16:15


    5/10/18 07:24


 


 


  (Duoneb Neb)  1 ampule  Q6HR NEB  PRN


 NEB  5/9/18 10:15


    5/10/18 13:33


 


 


  (Zofran  Odt)  4 mg  Q6H  PRN


 SL  5/9/18 22:45


     


 


 


  (Apresoline)  25 mg  Q8HR


 PO  5/10/18 22:00


    5/11/18 05:09


 








Allergies





Allergies


Coded Allergies


  levofloxacin (Verified Allergy, Severe, ITCHING, 5/7/18)





Review of Systems


All other ROS:  ROS reviewed as documented in chart





Exam


I&O / VS





Vital Signs








  Date Time  Temp Pulse Resp B/P (MAP) Pulse Ox O2 Delivery O2 Flow Rate FiO2


 


5/11/18 04:00  76      


 


5/11/18 04:00 97.3 76 18 160/88 (112) 98   


 


5/11/18 00:00  65      


 


5/11/18 00:00 98.1 74 18 158/88 (111) 98   


 


5/10/18 20:00 97.9 75 18 152/79 (103) 99   


 


5/10/18 20:00  76      


 


5/10/18 16:24  73      


 


5/10/18 16:00  77 18 183/93 (123) 98   


 


5/10/18 12:18  78      


 


5/10/18 09:49     97   21


 


5/10/18 09:07  73      


 


5/10/18 08:00  84 18 166/89 (114) 97   








General:  Alert and Oriented, No acute distress


Eye:  PERRL, EOMI


Respiratory:  Non-labored respirations


Cardiology:  Normal rate


Neurologic:  Alert, Oriented, Normal sensory, Normal motor, No focal defects, 

CN II-XII intact, Normal DTR's


Psychiatric:  Cooperative, Appropriate mood & affect











Indra Osei MD May 11, 2018 08:02

## 2018-05-11 NOTE — MB
cc:

Eber Seaman MD

****

 

 

DATE:

2018

 

YOB: 1965

 

REFERRING PHYSICIAN:

Dr. Foreman, hospitalist.

 

REASON FOR CONSULTATION:

I was asked to evaluate patient with history of severe hypertension, 

CVA/TIA and PFO noted on echocardiogram.

 

HISTORY OF PRESENT ILLNESS:

Mari Tipton is a pleasant 53-year-old  woman 

with a past medical history significant for longstanding hypertension,

COPD, CVA/TIA, hypercholesterolemia and diabetes mellitus.  She was 

admitted for an episode of slurred speech noted by her boyfriend.  She

also noted left arm weakness.  Emergency Room evaluation was 

significant for CT of the head that showed no acute intracranial 

pathology.  Initial blood pressure on presentation was 184/97.  She 

reports no recent episodes of chest pain with exertion or suggestive 

of angina.  Additionally, she relates no congestive episodes of 

orthopnea and paroxysmal nocturnal dyspnea, sustained palpitations, 

lightheadedness, dizziness, presyncope and syncope.

 

Echocardiogram showed a PFO, normal left ventricle size, ejection 

fraction 50-55%, mild concentric hypertrophy, mild/moderate left 

atrium dilatation, mild/moderate right atrium dilatation, mild mitral 

and tricuspid valve regurgitation.

 

MEDICATIONS PRIOR TO ADMISSION:

Reviewed and noted.

 

ALLERGIES:

LEVAQUIN.

 

PAST MEDICAL HISTORY:

As above.

 

PAST SURGICAL HISTORY:

Status post .

 

REVIEW OF SYSTEMS:

As above.  A 12-point review of systems is reviewed and noted.  No 

recent fever, chills, cough or any sputum production.  No recent 

gastrointestinal or genitourinary symptoms.

 

SOCIAL HISTORY:

She continues to smoke.  She denies illicit drug use.  She does not 

drink alcohol.

 

PHYSICAL EXAMINATION:

VITAL SIGNS:  Temperature 97.9, pulse 75, respirations 18, blood 

pressure 152/79, O2 saturation 99%.

GENERAL:  No acute distress, she wishes to go home today.

HEENT:  Anicteric.  PERRLA.  No xanthelasma.

NECK:  Flat JVD.  No carotid bruits.

LUNGS:  Clear to auscultation.

CARDIOVASCULAR:  Regular rate and rhythm.  2/6 systolic murmur left 

lower sternal border.

ABDOMEN:  Soft and nontender.

EXTREMITIES:  No peripheral edema.

 

LABORATORY DATA:

WBC 5.9, hemoglobin is 9.8, hematocrit is 30.1, platelet count is 

246,000.

Sodium 142, potassium 3.9, chloride 111, BUN 66, creatinine 3.83.

Glycosylated hemoglobin 5.8.

 

IMPRESSION:

1.  Transient ischemic accident.

2.  Severe hypertension.

3.  Patent foramen ovale.

4.  Chronic renal insufficiency.

 

PLAN:

1.  Okay to discharge home from a cardiac standpoint.

2.  Start Labetalol 50 mg b.i.d.

3.  Discontinue hydralazine.

4.  Continue all outpatient antihypertension medications.

5.  We will arrange for implantable loop recorder as outpatient to 

evaluate for paroxysmal atrial fibrillation.

6.  Instructed to maintain blood pressure diary.

7.  Instructed to follow up with me in 1-2 weeks after discharge.

 

Thank you for allowing me to contribute to the patient's care.  Thank 

you for this consultation.

 

 

__________________________________

MD MIREYA WhiteV/COLE

D: 2018, 01:35 PM

T: 2018, 02:23 PM

Visit #: [id number]

Job #: [id number]

## 2018-06-11 ENCOUNTER — HOSPITAL ENCOUNTER (EMERGENCY)
Dept: HOSPITAL 17 - NEPE | Age: 53
Discharge: HOME | End: 2018-06-11
Payer: MEDICARE

## 2018-06-11 VITALS
DIASTOLIC BLOOD PRESSURE: 69 MMHG | SYSTOLIC BLOOD PRESSURE: 119 MMHG | OXYGEN SATURATION: 97 % | HEART RATE: 68 BPM | RESPIRATION RATE: 17 BRPM

## 2018-06-11 VITALS
SYSTOLIC BLOOD PRESSURE: 131 MMHG | HEART RATE: 77 BPM | RESPIRATION RATE: 16 BRPM | DIASTOLIC BLOOD PRESSURE: 70 MMHG | OXYGEN SATURATION: 98 % | TEMPERATURE: 98 F

## 2018-06-11 VITALS
OXYGEN SATURATION: 97 % | DIASTOLIC BLOOD PRESSURE: 69 MMHG | HEART RATE: 73 BPM | SYSTOLIC BLOOD PRESSURE: 119 MMHG | RESPIRATION RATE: 17 BRPM

## 2018-06-11 VITALS — BODY MASS INDEX: 27.55 KG/M2 | HEIGHT: 65 IN | WEIGHT: 165.35 LBS

## 2018-06-11 VITALS
SYSTOLIC BLOOD PRESSURE: 125 MMHG | OXYGEN SATURATION: 98 % | HEART RATE: 86 BPM | RESPIRATION RATE: 18 BRPM | DIASTOLIC BLOOD PRESSURE: 75 MMHG

## 2018-06-11 DIAGNOSIS — Z79.84: ICD-10-CM

## 2018-06-11 DIAGNOSIS — E11.22: ICD-10-CM

## 2018-06-11 DIAGNOSIS — N18.5: ICD-10-CM

## 2018-06-11 DIAGNOSIS — Z79.899: ICD-10-CM

## 2018-06-11 DIAGNOSIS — D63.1: ICD-10-CM

## 2018-06-11 DIAGNOSIS — I12.9: Primary | ICD-10-CM

## 2018-06-11 LAB
ALBUMIN SERPL-MCNC: 2.9 GM/DL (ref 3.4–5)
ALP SERPL-CCNC: 149 U/L (ref 45–117)
ALT SERPL-CCNC: 65 U/L (ref 10–53)
AST SERPL-CCNC: 23 U/L (ref 15–37)
BASOPHILS # BLD AUTO: 0.1 TH/MM3 (ref 0–0.2)
BASOPHILS NFR BLD: 1 % (ref 0–2)
BILIRUB SERPL-MCNC: 0.3 MG/DL (ref 0.2–1)
BUN SERPL-MCNC: 80 MG/DL (ref 7–18)
CALCIUM SERPL-MCNC: 8.1 MG/DL (ref 8.5–10.1)
CHLORIDE SERPL-SCNC: 114 MEQ/L (ref 98–107)
COLOR UR: YELLOW
CREAT SERPL-MCNC: 5.14 MG/DL (ref 0.5–1)
EOSINOPHIL # BLD: 0.4 TH/MM3 (ref 0–0.4)
EOSINOPHIL NFR BLD: 6.2 % (ref 0–4)
ERYTHROCYTE [DISTWIDTH] IN BLOOD BY AUTOMATED COUNT: 16.5 % (ref 11.6–17.2)
GFR SERPLBLD BASED ON 1.73 SQ M-ARVRAT: 11 ML/MIN (ref 89–?)
GLUCOSE SERPL-MCNC: 62 MG/DL (ref 74–106)
GLUCOSE UR STRIP-MCNC: (no result) MG/DL
HCO3 BLD-SCNC: 17 MEQ/L (ref 21–32)
HCT VFR BLD CALC: 24 % (ref 35–46)
HGB BLD-MCNC: 7.8 GM/DL (ref 11.6–15.3)
HGB UR QL STRIP: (no result)
INR PPP: 1 RATIO
KETONES UR STRIP-MCNC: (no result) MG/DL
LYMPHOCYTES # BLD AUTO: 1.3 TH/MM3 (ref 1–4.8)
LYMPHOCYTES NFR BLD AUTO: 19.3 % (ref 9–44)
MCH RBC QN AUTO: 26.2 PG (ref 27–34)
MCHC RBC AUTO-ENTMCNC: 32.6 % (ref 32–36)
MCV RBC AUTO: 80.4 FL (ref 80–100)
MONOCYTE #: 0.9 TH/MM3 (ref 0–0.9)
MONOCYTES NFR BLD: 12.6 % (ref 0–8)
MUCOUS THREADS #/AREA URNS LPF: (no result) /LPF
NEUTROPHILS # BLD AUTO: 4.2 TH/MM3 (ref 1.8–7.7)
NEUTROPHILS NFR BLD AUTO: 60.9 % (ref 16–70)
NITRITE UR QL STRIP: (no result)
PLATELET # BLD: 240 TH/MM3 (ref 150–450)
PMV BLD AUTO: 7.6 FL (ref 7–11)
PROT SERPL-MCNC: 7 GM/DL (ref 6.4–8.2)
PROTHROMBIN TIME: 9.7 SEC (ref 9.8–11.6)
RBC # BLD AUTO: 2.98 MIL/MM3 (ref 4–5.3)
SODIUM SERPL-SCNC: 144 MEQ/L (ref 136–145)
SP GR UR STRIP: 1.01 (ref 1–1.03)
SQUAMOUS #/AREA URNS HPF: 3 /HPF (ref 0–5)
URINE LEUKOCYTE ESTERASE: (no result)
WBC # BLD AUTO: 6.9 TH/MM3 (ref 4–11)

## 2018-06-11 PROCEDURE — 80307 DRUG TEST PRSMV CHEM ANLYZR: CPT

## 2018-06-11 PROCEDURE — 86920 COMPATIBILITY TEST SPIN: CPT

## 2018-06-11 PROCEDURE — 86900 BLOOD TYPING SEROLOGIC ABO: CPT

## 2018-06-11 PROCEDURE — 99284 EMERGENCY DEPT VISIT MOD MDM: CPT

## 2018-06-11 PROCEDURE — 86850 RBC ANTIBODY SCREEN: CPT

## 2018-06-11 PROCEDURE — 85025 COMPLETE CBC W/AUTO DIFF WBC: CPT

## 2018-06-11 PROCEDURE — 81001 URINALYSIS AUTO W/SCOPE: CPT

## 2018-06-11 PROCEDURE — 80053 COMPREHEN METABOLIC PANEL: CPT

## 2018-06-11 PROCEDURE — 85730 THROMBOPLASTIN TIME PARTIAL: CPT

## 2018-06-11 PROCEDURE — 86922 COMPATIBILITY TEST ANTIGLOB: CPT

## 2018-06-11 PROCEDURE — 86901 BLOOD TYPING SEROLOGIC RH(D): CPT

## 2018-06-11 PROCEDURE — 85610 PROTHROMBIN TIME: CPT

## 2018-06-11 NOTE — PD
HPI


Chief Complaint:  Abnormal Results


Time Seen by Provider:  11:47


Travel History


International Travel<30 days:  No


Contact w/Intl Traveler<30days:  No


Traveled to known affect area:  No





History of Present Illness


HPI


53-year-old female with PMH of HTN, DM, stage IV CKD, COPD on Plavix presents 

to the ED for evaluation after phone call from her doctor reported abnormal 

result.  She reports that her doctor was concerned for anemia and worsening 

kidney function.  Patient endorses occasional shortness of breath.  She states 

that she was recently treated for a URI.  She states that this has been 

improving since that treatment.  She otherwise has no somatic complaints.  She 

denies headache, dizziness, weakness, chest pain, palpitations, abdominal pain, 

nausea, vomiting, changes in bowel habits, dysuria, hematuria.  She endorses 

history of iron deficiency anemia, states that she has been off iron 

supplementation for "about a year."  She states that she is in the process of 

having a PD cath implanted by Dr. Arita's office.  She has follow-up scheduled 

in early July.





PFSH


Past Medical History


Hx Anticoagulant Therapy:  Yes


Anemia:  Yes


Arthritis:  Yes (LEFT KNEE)


Asthma:  No


Blood Disorders:  No


Anxiety:  No


Depression:  No


Heart Rhythm Problems:  No


Cancer:  No


Cardiac Catheterization:  Yes


Cardiovascular Problems:  Yes


High Cholesterol:  Yes


Chemotherapy:  No


Chest Pain:  No


Congestive Heart Failure:  No


COPD:  Yes


Cerebrovascular Accident:  Yes


Diabetes:  Yes


Patient Takes Glucophage:  No


Diminished Hearing:  No


Endocrine:  Yes


Gastrointestinal Disorders:  Yes (GASTROPARESIS)


GERD:  Yes


Glaucoma:  No


Gout:  Yes


Genitourinary:  Yes


Hepatitis:  No


Hiatal Hernia:  No


Hypertension:  Yes


Immune Disorder:  No


Implanted Vascular Access Dvce:  No


Kidney Stones:  No


Musculoskeletal:  No


Neurologic:  Yes (CVA)


Psychiatric:  No


Reproductive:  No


Respiratory:  Yes


Immunizations Current:  Yes


Migraines:  No


Myocardial Infarction:  No


Pneumonia:  Yes


Radiation Therapy:  No


Renal Failure:  No


Seizures:  No


Sickle Cell Disease:  No


Sleep Apnea:  No


Thyroid Disease:  No


Ulcer:  No


Tetanus Vaccination:  Unknown


Influenza Vaccination:  Yes


Pregnant?:  Not Pregnant


Menopausal:  Yes


:  5


Para:  7


Miscarriage:  0


:  0


Tubal Ligation:  Yes





Past Surgical History


Abdominal Surgery:  No


Appendectomy:  No


Arteriovenous Shunt:  No


Cardiac Surgery:  No


 Section:  Yes (4)


Ear Surgery:  No


Endocrine Surgery:  No


Eye Surgery:  No


Genitourinary Surgery:  No


Gynecologic Surgery:  Yes (4 CSECTIONS)


Hysterectomy:  No


Oral Surgery:  No


Pacemaker:  No


Thoracic Surgery:  No


Other Surgery:  Yes (BREAST SURGERY LEFT BREAST ABCESS 2X )





Social History


Alcohol Use:  No


Tobacco Use:  No


Substance Use:  Yes (years ago)





Allergies-Medications


(Allergen,Severity, Reaction):  


Coded Allergies:  


     levofloxacin (Verified  Allergy, Severe, ITCHING, 18)


 OCCURRED THIS FIRST DOSE; ONLY RECEIVED 1/2


Reported Meds & Prescriptions





Reported Meds & Active Scripts


Active


Labetalol (Labetalol HCl) 100 Mg Tab 50 Mg PO BID 30 Days


Plavix (Clopidogrel Bisulfate) 75 Mg Tab 75 Mg PO DAILY 30 Days


Proair Hfa 8.5 GM Inh (Albuterol Sulfate) 90 Mcg/Act Aer 2 Puff INH Q4-6H PRN


     108 mcg/actuation


Nifedipine ER (Nifedipine) 90 Mg Tab 90 Mg PO DAILY


Reported


Atorvastatin (Atorvastatin Calcium) 80 Mg Tab 40 Mg PO HS


Tradjenta (Linagliptin) 5 Mg Tab 5 Mg PO DAILY


Glipizide 10 Mg Tab 10 Mg PO BIDAC


     Take 30 minutes before a meal


Zolpidem (Zolpidem Tartrate) 10 Mg Tab 10 Mg PO HS PRN


Vitamin C (Ascorbic Acid) 250 Mg Tab 500 Mg PO DAILY


Aspirin 81 Mg Chew 81 Mg CHEW DAILY


D3 Super Strength (Cholecalciferol) 2,000 Unit Cap 1,000 Units PO DAILY


Omega-3 Fish Oil/Vitamin (Fish Oil-Cholecalciferol) 1,000-1,000 Mg Cap 1 Cap PO 

DAILY








Review of Systems


Except as stated in HPI:  all other systems reviewed are Neg





Physical Exam


Narrative


GENERAL: Pleasant -American female no acute distress.  Sitting in the bed

, drinking water, eating [employer].


SKIN: Focused skin assessment warm/dry.


HEAD: Atraumatic. Normocephalic. 


EYES: Pupils equal and round. No scleral icterus. No injection or drainage. 


ENT: No nasal bleeding or discharge.  Mucous membranes pink and moist.


NECK: Trachea midline. No JVD. 


CARDIOVASCULAR: Regular rate and rhythm.  No murmur appreciated.


RESPIRATORY: No accessory muscle use. Clear to auscultation. Breath sounds 

clear and equal bilaterally. 


GASTROINTESTINAL: Abdomen soft, non-tender, nondistended. Hepatic and splenic 

margins not palpable.  No fluid wave.


MUSCULOSKELETAL: No obvious deformities. No clubbing.  No cyanosis.  No edema. 


NEUROLOGICAL: Awake and alert. No obvious cranial nerve deficits.  Motor 

grossly within normal limits. Normal speech.


PSYCHIATRIC: Appropriate mood and affect; insight and judgment normal.





Data


Data


Last Documented VS





Vital Signs








  Date Time  Temp Pulse Resp B/P (MAP) Pulse Ox O2 Delivery O2 Flow Rate FiO2


 


18 14:34        


 


18 13:52  86 18  98 Room Air  


 


18 11:30 98.0       








Orders





 Orders


Complete Blood Count With Diff (18 11:47)


Comprehensive Metabolic Panel (18 11:47)


Prothrombin Time / Inr (Pt) (18 11:47)


Act Partial Throm Time (Ptt) (18 11:47)


Alcohol (Ethanol) (18 11:47)


Urinalysis - C+S If Indicated (18 11:47)


Type And Screen (18 11:47)


Ecg Monitoring (18 11:47)


Iv Access Insert/Monitor (18 11:47)


Oximetry (18 11:47)


Sodium Chloride 0.9% Flush (Ns Flush) (18 12:00)


Red Blood Cells (Rbc) (18 11:55)


Vital Signs (Adult) Q4H (18 13:55)


Activity Oob With Assistance (18 13:55)


Intake + Output RYNE.QSHIFT (18 13:55)


Diet Clear Liquid (18 Lunch)


Sodium Chlor 0.9% 1000 Ml Inj (Ns 1000 M (18 13:55)


Sodium Chloride 0.9% Flush (Ns Flush) (18 14:00)


Sodium Chloride 0.9% Flush (Ns Flush) (18 21:00)


Acetaminophen (Tylenol) (18 14:00)


Metoclopramide Inj (Reglan Inj) (18 14:00)


Resp Oxygen Alex C Titrat 1-4 L (18 )


Pt Request For Service (18 13:55)


Case Management Consult (18 13:55)


Scd Bilateral/Knee High RYNE.BID (18 13:55)


Giorgi Bilateral/Knee High RYNE.QSHIFT (18 13:55)


Pharmacologic Contraindication (18 13:55)


Naloxone Inj (Narcan Inj) (18 14:00)


Docusate Sodium-Senna (Kalina-Colace) (18 21:00)


Magnesium Hydroxide Liq (Milk Of Magnesi (18 14:00)


Sennosides (Senokot) (18 14:00)


Bisacodyl Supp (Dulcolax Supp) (18 14:00)


Lactulose Liq (Lactulose Liq) (18 14:00)


Ed Discharge Order (18 14:28)





Labs





Laboratory Tests








Test


  18


11:55 18


14:20


 


White Blood Count 6.9 TH/MM3  


 


Red Blood Count 2.98 MIL/MM3  


 


Hemoglobin 7.8 GM/DL  


 


Hematocrit 24.0 %  


 


Mean Corpuscular Volume 80.4 FL  


 


Mean Corpuscular Hemoglobin 26.2 PG  


 


Mean Corpuscular Hemoglobin


Concent 32.6 % 


  


 


 


Red Cell Distribution Width 16.5 %  


 


Platelet Count 240 TH/MM3  


 


Mean Platelet Volume 7.6 FL  


 


Neutrophils (%) (Auto) 60.9 %  


 


Lymphocytes (%) (Auto) 19.3 %  


 


Monocytes (%) (Auto) 12.6 %  


 


Eosinophils (%) (Auto) 6.2 %  


 


Basophils (%) (Auto) 1.0 %  


 


Neutrophils # (Auto) 4.2 TH/MM3  


 


Lymphocytes # (Auto) 1.3 TH/MM3  


 


Monocytes # (Auto) 0.9 TH/MM3  


 


Eosinophils # (Auto) 0.4 TH/MM3  


 


Basophils # (Auto) 0.1 TH/MM3  


 


CBC Comment DIFF FINAL  


 


Differential Comment   


 


Prothrombin Time 9.7 SEC  


 


Prothromb Time International


Ratio 1.0 RATIO 


  


 


 


Activated Partial


Thromboplast Time 24.6 SEC 


  


 


 


Blood Urea Nitrogen 80 MG/DL  


 


Creatinine 5.14 MG/DL  


 


Random Glucose 62 MG/DL  


 


Total Protein 7.0 GM/DL  


 


Albumin 2.9 GM/DL  


 


Calcium Level 8.1 MG/DL  


 


Alkaline Phosphatase 149 U/L  


 


Aspartate Amino Transf


(AST/SGOT) 23 U/L 


  


 


 


Alanine Aminotransferase


(ALT/SGPT) 65 U/L 


  


 


 


Total Bilirubin 0.3 MG/DL  


 


Sodium Level 144 MEQ/L  


 


Potassium Level 3.9 MEQ/L  


 


Chloride Level 114 MEQ/L  


 


Carbon Dioxide Level 17.0 MEQ/L  


 


Anion Gap 13 MEQ/L  


 


Estimat Glomerular Filtration


Rate 11 ML/MIN 


  


 


 


Ethyl Alcohol Level


  LESS THAN 3


MG/DL 


 


 


Urine Color  YELLOW 


 


Urine Turbidity  HAZY 


 


Urine pH  6.0 


 


Urine Specific Gravity  1.015 


 


Urine Protein  300 mg/dL 


 


Urine Glucose (UA)  NEG mg/dL 


 


Urine Ketones  NEG mg/dL 


 


Urine Occult Blood  SMALL 


 


Urine Nitrite  NEG 


 


Urine Bilirubin  NEG 


 


Urine Urobilinogen


  


  LESS THAN 2.0


MG/DL


 


Urine Leukocyte Esterase  NEG 


 


Urine RBC  2 /hpf 


 


Urine WBC  2 /hpf 


 


Urine Squamous Epithelial


Cells 


  3 /hpf 


 


 


Urine Mucus  FEW /lpf 


 


Microscopic Urinalysis Comment


  


  CULT NOT


INDICATED











MDM


Medical Decision Making


Medical Screen Exam Complete:  Yes


Emergency Medical Condition:  Yes


Differential Diagnosis


Acute on chronic kidney disease versus anemia versus GI bleed versus metabolic 

derangement versus other


Narrative Course


53-year-old female with PMH of HTN, DM, stage IV CKD, COPD on Plavix presents 

to the ED for evaluation after abnormal result.  Patient states PCP was 

concerned for anemia and worsening kidney function.  She has no somatic 

complaints.  She endorses history of iron deficiency anemia, states that she 

has been off iron supplementation for "about a year."  She states that she is 

in the process of having a PD cath implanted by Dr. Arita's office.  She has 

follow-up scheduled in early July.  Vitals reviewed.  On exam this is a 

pleasant -American female in no acute distress.  She is sitting up in bed

, eating and drinking.  No evidence of ascites.





CBC: WBC 6.9.  Hemoglobin 7.8, chronic, slightly worsened per record review. 

Guaiac negative on rectal exam.


INR: 1.0.


CMP: BUN 80, creatinine 5.14.  Potassium 3.9.  Calcium 8.1.,  Corrects when 

hypoalbuminemia is accounted for.  Alk phos 149.


UA: No culture indicated.





I spoke with Dr. Arita.  I discussed the results of the workup, including the 

metabolic abnormalities.  He states that they already have outpatient PD cath 

placement planned.  He will move up her outpatient visit.  From his standpoint 

the patient can be discharged.  I discussed the results of the workup and the 

plan with the patient.  She is extremely happy for discharge.  I cautioned her 

that she should return to the ED for any worsening symptoms.  She understands 

that she should follow-up with Dr. Arita tomorrow to move up her appointment.  

She is stable and discharged home.





[facility] Point of Care


Internal Pos. & Neg. Controls:  Passed


Fecal Specimen Occult Blood:  Negative





Diagnosis





 Primary Impression:  


 Anemia in chronic kidney disease (CKD)


 Qualified Codes:  N18.9 - Chronic kidney disease, unspecified; D63.1 - Anemia 

in chronic kidney disease


 Additional Impression:  


 CKD (chronic kidney disease) stage 5, GFR less than 15 ml/min


Referrals:  


Juan Francisco Arita MD


Disposition:  01 DISCHARGE HOME


Condition:  Stable











Daria Rubin 2018 12:41

## 2024-04-05 NOTE — PD.CONS
Assessment and Plan


Assessment


Consult received per stroke order set.  EMR reviewed.





MRI negative for acute stroke.  Neurology consult reviewed.





Consult deferred due to no acute stroke.  Please reconsult as appropriate.





Thank you.











Corazon Plunkett MD May 9, 2018 15:57
History of Present Illness


Service


Neurology


Consult Requested By


ER


Reason for Consult


Slurred speech, poss CVA


Primary Care Physician


Barry James M.D.


History of Present Illness


52 y/o female with past hx of stroke, HTN, hyperlipidemia, COPD, CKD, DM 

presented to ER with slurred speech.  Reports her boyfriend noticed her having 

slurred speech around 10pm last night.  She is unsure how long it lasted, 

though now symptoms are resolved.  Reports she had a stroke about a year ago 

presenting with slurred speech and left arm weakness.  Denies any residual 

symptoms.  Weakness in arm improved after a few weeks.  Did not have any 

associated weakness with this recent episode.  No sensory changes, no change in 

gait, no change in vision.  Had been on ASA 81mg daily, but missed about a week 

of her ASA 81mg.  She follows with nephrology for her CKD and pulmonology for 

her COPD.  No hx of A-fib.  She is on medication at home for HTN and high 

cholesterol.  She has never been on Aggrenox or Plavix.  She denies any hx of 

cardiac issues.  She stopped smoking in 2017, though smoked about 1.5 ppd 

for most of her life prior to quitting.  


 (Sherry Wright)





Review of Systems


All other ROS:  ROS reviewed as documented in chart


 (Sherry Wright)





Past Family Social History


Allergies:  


Coded Allergies:  


     levofloxacin (Verified  Allergy, Severe, ITCHING, 18)


 OCCURRED THIS FIRST DOSE; ONLY RECEIVED 1/2


Past Medical History


HTN, hyperlipidemia, diabetic retinopathy, DM since , CVA - no residual 

symptoms, COPD, CKD


Past Surgical History


, tubal ligation


Active Ordered Medications





Current Medications








 Medications


  (Trade)  Dose


 Ordered  Sig/Danielle


 Route  Start Time


 Stop Time Status Last Admin


 


 Sodium Chloride  1,000 ml @ 


 70 mls/hr  [id number] ONCE


 IV  18 22:42


 18 12:59  18 22:59


 


 


 Sodium Chloride  1,000 ml @ 


 100 mls/hr  Q10H


 IV  18 00:07


    18 00:35


 


 


  (NS Flush)  2 ml  UNSCH  PRN


 IV FLUSH  18 00:15


     


 


 


  (NS Flush)  2 ml  BID


 IV FLUSH  18 09:00


     


 


 


  (Zofran Inj)  4 mg  Q6H  PRN


 IVP  5/8/18 00:15


     


 


 


  (Heparin Inj)  5,000 units  Q12H


 SQ  18 09:00


     


 


 


  (Narcan Inj)  0.4 mg  UNSCH  PRN


 IV PUSH  18 00:15


     


 


 


  (Milk Of


 Magnesia Liq)  30 ml  Q12H  PRN


 PO  18 00:15


     


 


 


  (Senokot)  17.2 mg  Q12H  PRN


 PO  18 00:15


     


 


 


  (Dulcolax Supp)  10 mg  DAILY  PRN


 RECTAL  18 00:15


     


 


 


  (Lactulose Liq)  30 ml  DAILY  PRN


 PO  18 00:15


     


 


 


  (NovoLOG


 SUPPLEMENTAL


 SCALE)  1  ACHS SLIDING  SCALE


 SQ  18 08:00


     


 


 


  (Proair Hfa Inh)  2 puff  Q4H  PRN


 INH  18 01:30


     


 


 


  (Aspirin Chew)  81 mg  DAILY


 CHEW  18 09:00


     


 


 


  (Lipitor)  40 mg  HS


 PO  18 21:00


     


 








Family History


mother CAD and dm


Social History


smoked 1.5 ppd x many years, stopped in 2017, no alcohol or illict drugs


 (Sherry Wright)





Exam


I&O / VS





Vital Signs








  Date Time  Temp Pulse Resp B/P (MAP) Pulse Ox O2 Delivery O2 Flow Rate FiO2


 


18 04:58  75 18 146/92 (110) 96 Room Air  


 


18 00:31  84 18 185/97 (126) 100 Room Air  


 


18 22:51   20  99 Room Air  


 


18 22:51  81 18 183/97 (125) 100 Room Air  


 


18 22:51     99 Room Air  


 


18 22:43     96   


 


18 22:43     96   21


 


18 22:33 98.9 79 16 184/97 (126) 97   








General:  Alert and Oriented, No acute distress


Eye:  PERRL, EOMI


Respiratory:  Non-labored respirations


Cardiology:  Normal rate


Neurologic:  Alert, Oriented, Normal sensory, Normal motor, No focal defects, 

CN II-XII intact, Normal DTR's


Psychiatric:  Cooperative


Exam Comments


gait normal, sensory normal to light touch, [id number]-f normal, no tremor, no facial 

droop, no drift, speech normal, no dysarthria or aphasia


 (Sherry Wright)





Review/Management


Diagnosis/Plan:  


(1) Slurred speech


ICD Codes:  R47.81 - Slurred speech


Status:  Resolved


Plan:  no longer having slurred speech, suspect this was exacerbation of 

previous stroke


CT did not show acute event


CTA head/neck unremarkable


check MRI brain








(2) Diabetes mellitus


ICD Codes:  E11.9 - Diabetes mellitus


Status:  Chronic


Plan:  discussed importance of blood sugar control with pt





(3) Hypertension


ICD Codes:  I10 - Hypertension


Status:  Acute


Plan:  elevated BP on admission, will continue to monitor 





pt aware of importance of controlling for secondary stroke risk factors as outpt





(4) Acute renal failure superimposed on stage 3 chronic kidney disease


ICD Codes:  N17.9 - Acute kidney failure, unspecified; N18.3 - Chronic kidney 

disease, stage 3 (moderate)


Plan:  CR 4.4





(5) Dyslipidemia


ICD Codes:  E78.5 - Dyslipidemia


Status:  Acute


Plan:  lipid panel pending


goal LDL 70





(6) CVA (cerebral infarction)


ICD Codes:  I63.9 - CVA (cerebral infarction)


Status:  Acute


Plan:  continue telemetry


symptoms have resolved


CT showed old CVA


will check MRI brain


CTA unremarkable


pt missed 1 week ASA 81mg 


discussed importance of controlling for secondary stroke risk factors


ECHO pending


 (Sherry Wright)


Diagnosis/Plan:  


(1) Slurred speech


ICD Codes:  R47.81 - Slurred speech


Status:  Resolved


Plan:  no longer having slurred speech, suspect this was exacerbation of 

previous stroke


CT did not show acute event


CTA head/neck unremarkable


check MRI brain- old strokes. moderate white matter dz. likely 2/2 chronic 

uncontrolled htn





seen and examined. d/w PA. agree with above








recs


aspirin and plavix


d/w pt importance of compliance with medications


on statin


bp goal <120/80


d/c planning in am


d/w rn


 (Indra Osei MD)





Problem Qualifiers





(1) Diabetes mellitus:  








Sherry Wright May 8, 2018 08:51


Indra Osei MD May 8, 2018 16:08
[facility]


Service


Nephrology


Consult Requested By





Reason for Consult


Acute on CKD


Primary Care Physician


Barry James M.D.


History of Present Illness


This is a 52 y/o AAF patient who came to ER for stroke like symptoms. It seems 

her symptoms have resolved. She has CKD, had a biopsy in April that showed 

diabetic nephropathy. Her creatinine has been increasing rapidly. In February 

it was around 2, increased to 3 in March, 3.4 in April. This admission it was 

4.4. A repeat renal panel has been ordered. Renal US showed bilateral 

hydronephrosis. She did receive IV contrast yesterday. She reports normal 

urination. We were consulted to assist. She is a full code. 


 (Kylah Lselie)





Review of Systems


Constitutional:  DENIES: Fatigue, Weight gain


Neurologic:  COMPLAINS OF: Speech Problems, DENIES: Abnormal gait, Headache, 

Localized weakness (Kylah Leslie)





Past Family Social History


Allergies:  


Coded Allergies:  


     levofloxacin (Verified  Allergy, Severe, ITCHING, 5/7/18)


 OCCURRED THIS FIRST DOSE; ONLY RECEIVED 1/2


Past Medical History


CKD 4, baseline creatinine 3.4; renal biopsy showing diabetic nephropathy with 

heavy proteinuria. 


COPD


Diabetes mellitus


Hypertension


Hyperlipidemia


Past Surgical History


Renal Biopsy


Cesarian


Reported Medications


Proair Hfa 8.5 GM Inh (Albuterol Sulfate) 90 Mcg/Act Aer 2 Puff INH Q4-6H PRN


     108 mcg/actuation


Losartan (Losartan Potassium) 50 Mg Tab 50 Mg PO BID


Nifedipine ER (Nifedipine) 90 Mg Tab 90 Mg PO DAILY


Atorvastatin (Atorvastatin Calcium) 80 Mg Tab 80 Mg PO HS


Tradjenta (Linagliptin) 5 Mg Tab 5 Mg PO DAILY


Glipizide 10 Mg Tab 10 Mg PO BIDAC


     Take 30 minutes before a meal


Zolpidem (Zolpidem Tartrate) 10 Mg Tab 10 Mg PO HS PRN


Vitamin C (Ascorbic Acid) 250 Mg Tab 500 Mg PO DAILY


Aspirin 81 Mg Chew 81 Mg CHEW DAILY


D3 Super Strength (Cholecalciferol) 2,000 Unit Cap 1,000 Units PO DAILY


Omega-3 Fish Oil/Vitamin (Fish Oil-Cholecalciferol) 1,000-1,000 Mg Cap 1 Cap PO 

DAILY


Active Ordered Medications





Current Medications








 Medications


  (Trade)  Dose


 Ordered  Sig/Danielle


 Route  Start Time


 Stop Time Status Last Admin


 


 Sodium Chloride  1,000 ml @ 


 70 mls/hr  [id number] ONCE


 IV  5/7/18 22:42


 5/8/18 12:59  5/7/18 22:59


 


 


 Sodium Chloride  1,000 ml @ 


 100 mls/hr  Q10H


 IV  5/8/18 00:07


    5/8/18 10:07


 


 


  (NS Flush)  2 ml  UNSCH  PRN


 IV FLUSH  5/8/18 00:15


     


 


 


  (NS Flush)  2 ml  BID


 IV FLUSH  5/8/18 09:00


     


 


 


  (Zofran Inj)  4 mg  Q6H  PRN


 IVP  5/8/18 00:15


     


 


 


  (Heparin Inj)  5,000 units  Q12H


 SQ  5/8/18 09:00


    5/8/18 10:41


 


 


  (Narcan Inj)  0.4 mg  UNSCH  PRN


 IV PUSH  5/8/18 00:15


     


 


 


  (Milk Of


 Magnesia Liq)  30 ml  Q12H  PRN


 PO  5/8/18 00:15


     


 


 


  (Senokot)  17.2 mg  Q12H  PRN


 PO  5/8/18 00:15


     


 


 


  (Dulcolax Supp)  10 mg  DAILY  PRN


 RECTAL  5/8/18 00:15


     


 


 


  (Lactulose Liq)  30 ml  DAILY  PRN


 PO  5/8/18 00:15


     


 


 


  (NovoLOG


 SUPPLEMENTAL


 SCALE)  1  ACHS SLIDING  SCALE


 SQ  5/8/18 08:00


     


 


 


  (Proair Hfa Inh)  2 puff  Q4H  PRN


 INH  5/8/18 01:30


     


 


 


  (Aspirin Chew)  81 mg  DAILY


 CHEW  5/8/18 09:00


    5/8/18 10:41


 


 


  (Lipitor)  40 mg  HS


 PO  5/8/18 21:00


     


 








Family History


DM, HTN


Social History


Former smoker


She is single


Full code


Independent


Unemployed


 (Kylah Leslie)





Physical Exam


Vital Signs





Vital Signs








  Date Time  Temp Pulse Resp B/P (MAP) Pulse Ox O2 Delivery O2 Flow Rate FiO2


 


5/8/18 09:09  74 24 183/103 (129) 98   


 


5/8/18 07:15 97.5 73 17 194/103 (133) 99 Room Air  


 


5/8/18 07:15  73 17  99 Room Air  


 


5/8/18 04:58  75 18 146/92 (110) 96 Room Air  


 


5/8/18 00:31  84 18 185/97 (126) 100 Room Air  


 


5/7/18 22:51   20  99 Room Air  


 


5/7/18 22:51  81 18 183/97 (125) 100 Room Air  


 


5/7/18 22:51     99 Room Air  


 


5/7/18 22:43     96   


 


5/7/18 22:43     96   21


 


5/7/18 22:33 98.9 79 16 184/97 (126) 97   








Physical Exam


Middle aged AAF


Awake, alert, follows commands


Lungs clear


S1/S2, RRR


Abd soft, bladder not palpable


Trace edema


Laboratory





Laboratory Tests








Test


  5/7/18


22:45 5/7/18


23:17


 


White Blood Count 5.9  


 


Red Blood Count 3.66  


 


Hemoglobin 9.8  


 


Bedside Hemoglobin 9.5  


 


Hematocrit 30.1  


 


Bedside Hematocrit 28.0  


 


Mean Corpuscular Volume 82.3  


 


Mean Corpuscular Hemoglobin 26.7  


 


Mean Corpuscular Hemoglobin


Concent 32.5 


  


 


 


Red Cell Distribution Width 15.9  


 


Platelet Count 246  


 


Mean Platelet Volume 8.0  


 


Neutrophils (%) (Auto) 46.7  


 


Lymphocytes (%) (Auto) 33.2  


 


Monocytes (%) (Auto) 10.9  


 


Eosinophils (%) (Auto) 8.4  


 


Basophils (%) (Auto) 0.8  


 


Neutrophils # (Auto) 2.7  


 


Lymphocytes # (Auto) 1.9  


 


Monocytes # (Auto) 0.6  


 


Eosinophils # (Auto) 0.5  


 


Basophils # (Auto) 0.0  


 


CBC Comment DIFF FINAL  


 


Differential Comment   


 


Prothrombin Time 9.8  


 


Prothromb Time International


Ratio 1.0 


  


 


 


Activated Partial


Thromboplast Time 23.8 


  


 


 


Fibrinogen 432  


 


Bedside Sodium 142  


 


Bedside Potassium 3.9  


 


Bedside Chloride 111  


 


Bedside Blood Urea Nitrogen 76  


 


Bedside Creatinine 4.4  


 


Bedside Glucose 126  


 


Total Creatine Kinase 265  


 


Creatine Kinase MB 3.1  


 


Creatine Kinase MB % 1.2  


 


Troponin I 0.10  


 


Urine Color  LIGHT-YELLOW 


 


Urine Turbidity  CLEAR 


 


Urine pH  6.0 


 


Urine Specific Gravity  1.011 


 


Urine Protein  300 


 


Urine Glucose (UA)  NEG 


 


Urine Ketones  NEG 


 


Urine Occult Blood  SMALL 


 


Urine Nitrite  NEG 


 


Urine Bilirubin  NEG 


 


Urine Urobilinogen  LESS THAN 2.0 


 


Urine Leukocyte Esterase  NEG 


 


Urine RBC  LESS THAN 1 


 


Urine WBC  1 


 


Urine Squamous Epithelial


Cells 


  2 


 


 


Urine Transitional Epithelial


Cells 


  <1 


 


 


Urine Bacteria  RARE 


 


Urine Mucus  FEW 


 


Microscopic Urinalysis Comment


  


  CULT NOT


INDICATED


 


Urine Random Creatinine  43.8 


 


Urine Random Sodium  94 


 


Urine Opiates Screen  NEG 


 


Urine Barbiturates Screen  NEG 


 


Urine Amphetamines Screen  NEG 


 


Urine Benzodiazepines Screen  NEG 


 


Urine Cocaine Screen  NEG 


 


Urine Cannabinoids Screen  NEG 








 (Kylah Leslie)


Result Diagram:  


5/7/18 2245





Imaging





Last 72 hours Impressions








Renal Ultrasound 5/8/18 0000 Signed





Impressions: 





 Service Date/Time:  Tuesday, May 8, 2018 07:41 - CONCLUSION:  1. Mild 





 hydronephrosis. Mild medical renal disease. Bladder unremarkable.     Gabe Brock MD 


 


Brain MRI 5/8/18 0000 Signed





Impressions: 





 Service Date/Time:  Tuesday, May 8, 2018 10:05 - CONCLUSION:  1. Severe 

chronic 





 changes with old infarcts involving the inferior aspect of the left cerebellar 





 hemisphere, superior aspect of the right cerebellar hemisphere, lacunar type 





 infarct in the right side of the tawana and punctate lacunar type infarcts in 

the 





 left basal ganglia. 2. Severe periventricular and scattered deep white matter 





 tract areas of small vessel ischemic demyelination. 3. Nothing acute     Adan Luther MD 


 


Abdomen/Pelvis CT 5/8/18 0000 Signed





Impressions: 





 Service Date/Time:  Tuesday, May 8, 2018 10:25 - CONCLUSION:  1. There is some 





 asymmetric prominence of the left renal pelvis but I do not see rukhsana 





 hydronephrosis. Contrast is seen in both renal collecting systems from the 

prior 





 contrasted CTA of the carotids and intracranial vessels. 2. Enlarged, fibroid 





 uterus. 3. Vicarious excretion of contrast in the gallbladder lumen. 4. 





 Parenchymal cyst in the left lingula/lung base with some pleural parenchymal 





 scarring or atelectasis in the medial right base and left lingula. 5. Possible 





 small, 2.1 cm left ovarian cyst.     Adan Luther MD 


 


Neck CTA 5/7/18 2242 Signed





Impressions: 





 Service Date/Time:  Monday, May 7, 2018 22:49 - CONCLUSION:  Mild 





 atherosclerotic disease bilaterally in the internal carotid bulb. No 





 two-dimensional stenosis is noted.     Delvis Lozada MD 


 


Head CTA 5/7/18 2242 Signed





Impressions: 





 Service Date/Time:  Monday, May 7, 2018 22:49 - CONCLUSION:  Limit exam 

possibly 





 related to motion. I don't see any definite filling defects or obvious 

stenoses 





 but it is a limited exam.     Delvis Lozada MD 


 


Head CT 5/7/18 0000 Signed





Impressions: 





 Service Date/Time:  Monday, May 7, 2018 22:47 - CONCLUSION:  Multiple old 





 strokes including the cerebellar hemispheres. No evidence of hemorrhage or 





 edema.  This report was called to Dr. Chacon at <2259 hrs.>.       Delvis Lozada MD 








 (Kylah Leslie)





Assessment and Plan


Problem List:  


(1) Acute kidney injury superimposed on chronic kidney disease


ICD Codes:  N17.9 - Acute kidney failure, unspecified; N18.9 - Chronic kidney 

disease, unspecified


Plan:  BRONSON on CKD 4, baseline creatinine 3.4


Repeat labs pending


She received IV contrast on admission


CT abdomen ordered as renal US showed bilateral hydronephrosis


Avoid nephrotoxic agents


Monitor urine output


Stop IVF





(2) CVA (cerebral infarction)


ICD Codes:  I63.9 - CVA (cerebral infarction)


Status:  Acute


Plan:  vs TIA


Imaging reviewed, symptoms resolved. 





(3) Diabetes mellitus


ICD Codes:  E11.9 - Diabetes mellitus


Status:  Chronic


Plan:  Maintain glucose 140-180mg/dL while hospitalized 





(4) Hypertension


ICD Codes:  I10 - Hypertension


Status:  Acute


Plan:  Monitor blood pressure, resume nifedipine


Stop IVF


 (Kylah Leslie)


Problem List:  


(1) Acute kidney injury superimposed on chronic kidney disease


ICD Codes:  N17.9 - Acute kidney failure, unspecified; N18.9 - Chronic kidney 

disease, unspecified


Plan:  BRONSON on CKD 4, baseline creatinine 3.4


Repeat labs pending


She received IV contrast on admission


CT abdomen ordered as renal US showed bilateral hydronephrosis


Avoid nephrotoxic agents


Monitor urine output


Stop IVF





(2) CVA (cerebral infarction)


ICD Codes:  I63.9 - CVA (cerebral infarction)


Status:  Acute


Plan:  vs TIA


Imaging reviewed, symptoms resolved. 





(3) Diabetes mellitus


ICD Codes:  E11.9 - Diabetes mellitus


Status:  Chronic


Plan:  Maintain glucose 140-180mg/dL while hospitalized 





(4) Hypertension


ICD Codes:  I10 - Hypertension


Status:  Acute


Plan:  Monitor blood pressure, resume nifedipine


Stop IVF





Assessment and Plan


patient was seen and examined. She has biopsy proven diabetic nephropathy. 

Renal function is worse, she has heavy proteinuria: she is at risk for 

progressive renal dysfunction. Also, she received IV contrast yesterday. 


CT of the abdomen was negative for hydronephrosis. 


Monitor. 


No immediate need for dialysis. 


 (Moose Levi MD)





Problem Qualifiers





(1) Diabetes mellitus:  








Kylah Leslie May 8, 2018 12:03


Moose Levi MD May 8, 2018 21:37
12-16 weeks